# Patient Record
Sex: FEMALE | Race: WHITE | NOT HISPANIC OR LATINO | URBAN - METROPOLITAN AREA
[De-identification: names, ages, dates, MRNs, and addresses within clinical notes are randomized per-mention and may not be internally consistent; named-entity substitution may affect disease eponyms.]

---

## 2017-05-07 ENCOUNTER — EMERGENCY (EMERGENCY)
Facility: HOSPITAL | Age: 82
LOS: 1 days | End: 2017-05-07
Payer: MEDICARE

## 2017-05-07 PROCEDURE — 99283 EMERGENCY DEPT VISIT LOW MDM: CPT | Mod: 25

## 2017-05-07 PROCEDURE — 12004 RPR S/N/AX/GEN/TRK7.6-12.5CM: CPT

## 2017-05-09 ENCOUNTER — EMERGENCY (EMERGENCY)
Facility: HOSPITAL | Age: 82
LOS: 1 days | End: 2017-05-09
Payer: MEDICARE

## 2017-05-09 PROCEDURE — 99282 EMERGENCY DEPT VISIT SF MDM: CPT

## 2018-06-11 ENCOUNTER — OUTPATIENT (OUTPATIENT)
Dept: OUTPATIENT SERVICES | Facility: HOSPITAL | Age: 83
LOS: 1 days | End: 2018-06-11
Payer: MEDICARE

## 2018-06-11 VITALS
WEIGHT: 154.32 LBS | HEART RATE: 84 BPM | TEMPERATURE: 97 F | DIASTOLIC BLOOD PRESSURE: 90 MMHG | SYSTOLIC BLOOD PRESSURE: 180 MMHG | RESPIRATION RATE: 16 BRPM | HEIGHT: 60 IN

## 2018-06-11 DIAGNOSIS — Z98.890 OTHER SPECIFIED POSTPROCEDURAL STATES: Chronic | ICD-10-CM

## 2018-06-11 DIAGNOSIS — Z29.9 ENCOUNTER FOR PROPHYLACTIC MEASURES, UNSPECIFIED: ICD-10-CM

## 2018-06-11 DIAGNOSIS — I10 ESSENTIAL (PRIMARY) HYPERTENSION: ICD-10-CM

## 2018-06-11 DIAGNOSIS — Z01.818 ENCOUNTER FOR OTHER PREPROCEDURAL EXAMINATION: ICD-10-CM

## 2018-06-11 DIAGNOSIS — R19.00 INTRA-ABDOMINAL AND PELVIC SWELLING, MASS AND LUMP, UNSPECIFIED SITE: ICD-10-CM

## 2018-06-11 PROBLEM — Z00.00 ENCOUNTER FOR PREVENTIVE HEALTH EXAMINATION: Status: ACTIVE | Noted: 2018-06-11

## 2018-06-11 LAB
ALBUMIN SERPL ELPH-MCNC: 4.5 G/DL — SIGNIFICANT CHANGE UP (ref 3.3–5.2)
ALP SERPL-CCNC: 64 U/L — SIGNIFICANT CHANGE UP (ref 40–120)
ALT FLD-CCNC: 11 U/L — SIGNIFICANT CHANGE UP
ANION GAP SERPL CALC-SCNC: 15 MMOL/L — SIGNIFICANT CHANGE UP (ref 5–17)
APTT BLD: 27.7 SEC — SIGNIFICANT CHANGE UP (ref 27.5–37.4)
AST SERPL-CCNC: 21 U/L — SIGNIFICANT CHANGE UP
BASOPHILS # BLD AUTO: 0 K/UL — SIGNIFICANT CHANGE UP (ref 0–0.2)
BASOPHILS NFR BLD AUTO: 0.5 % — SIGNIFICANT CHANGE UP (ref 0–2)
BILIRUB SERPL-MCNC: 0.4 MG/DL — SIGNIFICANT CHANGE UP (ref 0.4–2)
BLD GP AB SCN SERPL QL: SIGNIFICANT CHANGE UP
BUN SERPL-MCNC: 31 MG/DL — HIGH (ref 8–20)
CALCIUM SERPL-MCNC: 9.9 MG/DL — SIGNIFICANT CHANGE UP (ref 8.6–10.2)
CANCER AG125 SERPL-ACNC: 19 U/ML — SIGNIFICANT CHANGE UP
CEA SERPL-MCNC: 8.1 NG/ML — HIGH (ref 0–3.8)
CHLORIDE SERPL-SCNC: 95 MMOL/L — LOW (ref 98–107)
CO2 SERPL-SCNC: 25 MMOL/L — SIGNIFICANT CHANGE UP (ref 22–29)
CREAT SERPL-MCNC: 1.25 MG/DL — SIGNIFICANT CHANGE UP (ref 0.5–1.3)
EOSINOPHIL # BLD AUTO: 0.3 K/UL — SIGNIFICANT CHANGE UP (ref 0–0.5)
EOSINOPHIL NFR BLD AUTO: 5.1 % — SIGNIFICANT CHANGE UP (ref 0–6)
GLUCOSE SERPL-MCNC: 96 MG/DL — SIGNIFICANT CHANGE UP (ref 70–115)
HBA1C BLD-MCNC: 5.6 % — SIGNIFICANT CHANGE UP (ref 4–5.6)
HCT VFR BLD CALC: 39.3 % — SIGNIFICANT CHANGE UP (ref 37–47)
HGB BLD-MCNC: 12.5 G/DL — SIGNIFICANT CHANGE UP (ref 12–16)
INR BLD: 1.06 RATIO — SIGNIFICANT CHANGE UP (ref 0.88–1.16)
LYMPHOCYTES # BLD AUTO: 1.3 K/UL — SIGNIFICANT CHANGE UP (ref 1–4.8)
LYMPHOCYTES # BLD AUTO: 22.9 % — SIGNIFICANT CHANGE UP (ref 20–55)
MCHC RBC-ENTMCNC: 29.3 PG — SIGNIFICANT CHANGE UP (ref 27–31)
MCHC RBC-ENTMCNC: 31.8 G/DL — LOW (ref 32–36)
MCV RBC AUTO: 92.3 FL — SIGNIFICANT CHANGE UP (ref 81–99)
MONOCYTES # BLD AUTO: 0.6 K/UL — SIGNIFICANT CHANGE UP (ref 0–0.8)
MONOCYTES NFR BLD AUTO: 9.4 % — SIGNIFICANT CHANGE UP (ref 3–10)
NEUTROPHILS # BLD AUTO: 3.6 K/UL — SIGNIFICANT CHANGE UP (ref 1.8–8)
NEUTROPHILS NFR BLD AUTO: 61.9 % — SIGNIFICANT CHANGE UP (ref 37–73)
PLATELET # BLD AUTO: 264 K/UL — SIGNIFICANT CHANGE UP (ref 150–400)
POTASSIUM SERPL-MCNC: 4.5 MMOL/L — SIGNIFICANT CHANGE UP (ref 3.5–5.3)
POTASSIUM SERPL-SCNC: 4.5 MMOL/L — SIGNIFICANT CHANGE UP (ref 3.5–5.3)
PROT SERPL-MCNC: 7.4 G/DL — SIGNIFICANT CHANGE UP (ref 6.6–8.7)
PROTHROM AB SERPL-ACNC: 11.7 SEC — SIGNIFICANT CHANGE UP (ref 9.8–12.7)
RBC # BLD: 4.26 M/UL — LOW (ref 4.4–5.2)
RBC # FLD: 13.6 % — SIGNIFICANT CHANGE UP (ref 11–15.6)
SODIUM SERPL-SCNC: 135 MMOL/L — SIGNIFICANT CHANGE UP (ref 135–145)
TYPE + AB SCN PNL BLD: SIGNIFICANT CHANGE UP
WBC # BLD: 5.8 K/UL — SIGNIFICANT CHANGE UP (ref 4.8–10.8)
WBC # FLD AUTO: 5.8 K/UL — SIGNIFICANT CHANGE UP (ref 4.8–10.8)

## 2018-06-11 PROCEDURE — 85027 COMPLETE CBC AUTOMATED: CPT

## 2018-06-11 PROCEDURE — 86301 IMMUNOASSAY TUMOR CA 19-9: CPT

## 2018-06-11 PROCEDURE — 82378 CARCINOEMBRYONIC ANTIGEN: CPT

## 2018-06-11 PROCEDURE — 83036 HEMOGLOBIN GLYCOSYLATED A1C: CPT

## 2018-06-11 PROCEDURE — 86850 RBC ANTIBODY SCREEN: CPT

## 2018-06-11 PROCEDURE — 85610 PROTHROMBIN TIME: CPT

## 2018-06-11 PROCEDURE — 86900 BLOOD TYPING SEROLOGIC ABO: CPT

## 2018-06-11 PROCEDURE — 93010 ELECTROCARDIOGRAM REPORT: CPT

## 2018-06-11 PROCEDURE — 80053 COMPREHEN METABOLIC PANEL: CPT

## 2018-06-11 PROCEDURE — 71046 X-RAY EXAM CHEST 2 VIEWS: CPT

## 2018-06-11 PROCEDURE — 86304 IMMUNOASSAY TUMOR CA 125: CPT

## 2018-06-11 PROCEDURE — 85730 THROMBOPLASTIN TIME PARTIAL: CPT

## 2018-06-11 PROCEDURE — G0463: CPT

## 2018-06-11 PROCEDURE — 93005 ELECTROCARDIOGRAM TRACING: CPT

## 2018-06-11 PROCEDURE — 86901 BLOOD TYPING SEROLOGIC RH(D): CPT

## 2018-06-11 PROCEDURE — 36415 COLL VENOUS BLD VENIPUNCTURE: CPT

## 2018-06-11 PROCEDURE — 71046 X-RAY EXAM CHEST 2 VIEWS: CPT | Mod: 26

## 2018-06-11 RX ORDER — UBIDECARENONE 100 MG
1 CAPSULE ORAL
Qty: 0 | Refills: 0 | COMMUNITY

## 2018-06-11 RX ORDER — LEVOTHYROXINE SODIUM 125 MCG
1 TABLET ORAL
Qty: 0 | Refills: 0 | COMMUNITY

## 2018-06-11 RX ORDER — CHOLECALCIFEROL (VITAMIN D3) 125 MCG
1 CAPSULE ORAL
Qty: 0 | Refills: 0 | COMMUNITY

## 2018-06-11 RX ORDER — SODIUM CHLORIDE 9 MG/ML
3 INJECTION INTRAMUSCULAR; INTRAVENOUS; SUBCUTANEOUS ONCE
Qty: 0 | Refills: 0 | Status: DISCONTINUED | OUTPATIENT
Start: 2018-06-19 | End: 2018-07-04

## 2018-06-11 NOTE — H&P PST ADULT - ASSESSMENT
89 yo female with pelvic mass.  CAPRINI SCORE [CLOT]    AGE RELATED RISK FACTORS                                                       MOBILITY RELATED FACTORS  [ ] Age 41-60 years                                            (1 Point)                  [ ] Bed rest                                                        (1 Point)  [ ] Age: 61-74 years                                           (2 Points)                 [ ] Plaster cast                                                   (2 Points)  [x ] Age= 75 years                                              (3 Points)                 [ ] Bed bound for more than 72 hours                 (2 Points)    DISEASE RELATED RISK FACTORS                                               GENDER SPECIFIC FACTORS  [ ] Edema in the lower extremities                       (1 Point)                  [ ] Pregnancy                                                     (1 Point)  [ ] Varicose veins                                               (1 Point)                  [ ] Post-partum < 6 weeks                                   (1 Point)             [ x] BMI > 25 Kg/m2                                            (1 Point)                  [ ] Hormonal therapy  or oral contraception          (1 Point)                 [ ] Sepsis (in the previous month)                        (1 Point)                  [ ] History of pregnancy complications                 (1 point)  [ ] Pneumonia or serious lung disease                                               [1 ] Unexplained or recurrent                     (1 Point)           (in the previous month)                               (1 Point)  [ ] Abnormal pulmonary function test                     (1 Point)                 SURGERY RELATED RISK FACTORS  [ ] Acute myocardial infarction                              (1 Point)                 [ ]  Section                                             (1 Point)  [ ] Congestive heart failure (in the previous month)  (1 Point)               [ ] Minor surgery                                                  (1 Point)   [ ] Inflammatory bowel disease                             (1 Point)                 [ ] Arthroscopic surgery                                        (2 Points)  [ ] Central venous access                                      (2 Points)                [x ] General surgery lasting more than 45 minutes   (2 Points)       [ ] Stroke (in the previous month)                          (5 Points)               [ ] Elective arthroplasty                                         (5 Points)                                                                                                                                               HEMATOLOGY RELATED FACTORS                                                 TRAUMA RELATED RISK FACTORS  [ ] Prior episodes of VTE                                     (3 Points)                 [ ] Fracture of the hip, pelvis, or leg                       (5 Points)  [ ] Positive family history for VTE                         (3 Points)                 [ ] Acute spinal cord injury (in the previous month)  (5 Points)  [ ] Prothrombin 80345 A                                     (3 Points)                 [ ] Paralysis  (less than 1 month)                             (5 Points)  [ ] Factor V Leiden                                             (3 Points)                  [ ] Multiple Trauma within 1 month                        (5 Points)  [ ] Lupus anticoagulants                                     (3 Points)                                                           [ ] Anticardiolipin antibodies                               (3 Points)                                                       [ ] High homocysteine in the blood                      (3 Points)                                             [ ] Other congenital or acquired thrombophilia      (3 Points)                                                [ ] Heparin induced thrombocytopenia                  (3 Points)                                          Total Score [   7  ]

## 2018-06-12 ENCOUNTER — APPOINTMENT (OUTPATIENT)
Dept: CARDIOLOGY | Facility: CLINIC | Age: 83
End: 2018-06-12
Payer: MEDICARE

## 2018-06-12 VITALS
WEIGHT: 154 LBS | DIASTOLIC BLOOD PRESSURE: 70 MMHG | HEART RATE: 90 BPM | SYSTOLIC BLOOD PRESSURE: 128 MMHG | HEIGHT: 66 IN | BODY MASS INDEX: 24.75 KG/M2

## 2018-06-12 DIAGNOSIS — Z78.9 OTHER SPECIFIED HEALTH STATUS: ICD-10-CM

## 2018-06-12 DIAGNOSIS — I10 ESSENTIAL (PRIMARY) HYPERTENSION: ICD-10-CM

## 2018-06-12 DIAGNOSIS — Z01.810 ENCOUNTER FOR PREPROCEDURAL CARDIOVASCULAR EXAMINATION: ICD-10-CM

## 2018-06-12 LAB — CANCER AG19-9 SERPL-ACNC: 14.4 U/ML — SIGNIFICANT CHANGE UP

## 2018-06-12 PROCEDURE — 99203 OFFICE O/P NEW LOW 30 MIN: CPT

## 2018-06-12 RX ORDER — QUINAPRIL AND HYDROCHLOROTHIAZIDE 12.5; 2 MG/1; MG/1
20-12.5 TABLET ORAL
Qty: 90 | Refills: 0 | Status: ACTIVE | COMMUNITY
Start: 2017-12-22

## 2018-06-12 RX ORDER — LEVOTHYROXINE SODIUM 0.1 MG/1
100 TABLET ORAL
Qty: 90 | Refills: 0 | Status: ACTIVE | COMMUNITY
Start: 2017-06-07

## 2018-06-12 RX ORDER — ALBUTEROL SULFATE 90 UG/1
108 (90 BASE) AEROSOL, METERED RESPIRATORY (INHALATION)
Qty: 85 | Refills: 0 | Status: ACTIVE | COMMUNITY
Start: 2017-12-22

## 2018-06-13 ENCOUNTER — APPOINTMENT (OUTPATIENT)
Dept: CARDIOLOGY | Facility: CLINIC | Age: 83
End: 2018-06-13
Payer: MEDICARE

## 2018-06-13 PROCEDURE — 93015 CV STRESS TEST SUPVJ I&R: CPT

## 2018-06-13 PROCEDURE — 93306 TTE W/DOPPLER COMPLETE: CPT

## 2018-06-19 ENCOUNTER — OUTPATIENT (OUTPATIENT)
Dept: OUTPATIENT SERVICES | Facility: HOSPITAL | Age: 83
LOS: 1 days | End: 2018-06-19
Payer: MEDICARE

## 2018-06-19 ENCOUNTER — RESULT REVIEW (OUTPATIENT)
Age: 83
End: 2018-06-19

## 2018-06-19 VITALS
RESPIRATION RATE: 15 BRPM | HEART RATE: 65 BPM | OXYGEN SATURATION: 98 % | DIASTOLIC BLOOD PRESSURE: 65 MMHG | SYSTOLIC BLOOD PRESSURE: 160 MMHG

## 2018-06-19 VITALS
HEIGHT: 66 IN | TEMPERATURE: 98 F | RESPIRATION RATE: 14 BRPM | DIASTOLIC BLOOD PRESSURE: 64 MMHG | WEIGHT: 154.32 LBS | HEART RATE: 80 BPM | SYSTOLIC BLOOD PRESSURE: 149 MMHG | OXYGEN SATURATION: 100 %

## 2018-06-19 DIAGNOSIS — Z98.890 OTHER SPECIFIED POSTPROCEDURAL STATES: Chronic | ICD-10-CM

## 2018-06-19 DIAGNOSIS — R19.00 INTRA-ABDOMINAL AND PELVIC SWELLING, MASS AND LUMP, UNSPECIFIED SITE: ICD-10-CM

## 2018-06-19 LAB
BLD GP AB SCN SERPL QL: SIGNIFICANT CHANGE UP
GLUCOSE BLDC GLUCOMTR-MCNC: 98 MG/DL — SIGNIFICANT CHANGE UP (ref 70–99)
TYPE + AB SCN PNL BLD: SIGNIFICANT CHANGE UP

## 2018-06-19 PROCEDURE — 86850 RBC ANTIBODY SCREEN: CPT

## 2018-06-19 PROCEDURE — 86901 BLOOD TYPING SEROLOGIC RH(D): CPT

## 2018-06-19 PROCEDURE — 88307 TISSUE EXAM BY PATHOLOGIST: CPT | Mod: 26

## 2018-06-19 PROCEDURE — 58661 LAPAROSCOPY REMOVE ADNEXA: CPT

## 2018-06-19 PROCEDURE — 86900 BLOOD TYPING SEROLOGIC ABO: CPT

## 2018-06-19 PROCEDURE — 88112 CYTOPATH CELL ENHANCE TECH: CPT | Mod: 26

## 2018-06-19 PROCEDURE — 82962 GLUCOSE BLOOD TEST: CPT

## 2018-06-19 PROCEDURE — 88112 CYTOPATH CELL ENHANCE TECH: CPT

## 2018-06-19 PROCEDURE — 36415 COLL VENOUS BLD VENIPUNCTURE: CPT

## 2018-06-19 RX ORDER — SODIUM CHLORIDE 9 MG/ML
1000 INJECTION, SOLUTION INTRAVENOUS
Qty: 0 | Refills: 0 | Status: DISCONTINUED | OUTPATIENT
Start: 2018-06-19 | End: 2018-06-20

## 2018-06-19 RX ORDER — ONDANSETRON 8 MG/1
4 TABLET, FILM COATED ORAL ONCE
Qty: 0 | Refills: 0 | Status: DISCONTINUED | OUTPATIENT
Start: 2018-06-19 | End: 2018-06-20

## 2018-06-19 RX ORDER — FENTANYL CITRATE 50 UG/ML
50 INJECTION INTRAVENOUS
Qty: 0 | Refills: 0 | Status: DISCONTINUED | OUTPATIENT
Start: 2018-06-19 | End: 2018-06-20

## 2018-06-19 RX ORDER — CEFAZOLIN SODIUM 1 G
2000 VIAL (EA) INJECTION ONCE
Qty: 0 | Refills: 0 | Status: COMPLETED | OUTPATIENT
Start: 2018-06-19 | End: 2018-06-19

## 2018-06-19 RX ADMIN — Medication 100 MILLIGRAM(S): at 15:20

## 2018-06-19 NOTE — ASU DISCHARGE PLAN (ADULT/PEDIATRIC). - MEDICATION SUMMARY - MEDICATIONS TO TAKE
I will START or STAY ON the medications listed below when I get home from the hospital:    Naprosyn 500 mg oral tablet  -- 1 tab(s) by mouth 2 times a day   -- Check with your doctor before becoming pregnant.  May cause drowsiness or dizziness.  Obtain medical advice before taking any non-prescription drugs as some may affect the action of this medication.  Take with food or milk.    -- Indication: For Pain Control    Percocet 5/325 oral tablet  -- 1 tab(s) by mouth every 6 hours, As Needed -for severe pain MDD:4 tabs   -- Caution federal law prohibits the transfer of this drug to any person other  than the person for whom it was prescribed.  May cause drowsiness.  Alcohol may intensify this effect.  Use care when operating dangerous machinery.  This prescription cannot be refilled.  This product contains acetaminophen.  Do not use  with any other product containing acetaminophen to prevent possible liver damage.  Using more of this medication than prescribed may cause serious breathing problems.    -- Indication: For Pain Control    quinapril-hydrochlorothiazide 20mg-12.5mg oral tablet  -- 1 tab(s) by mouth once a day  -- Indication: For As per PMD    Co Q-10 100 mg oral capsule  -- 1 cap(s) by mouth once a day  -- Indication: For As per PMD    levothyroxine 100 mcg (0.1 mg) oral tablet  -- 1 tab(s) by mouth once a day  -- Indication: For As per PMD    Vitamin C, E, and Neha Hips oral capsule  -- 1 cap(s) by mouth once a day  -- Indication: For As per PMD    Vitamin D3 1000 intl units oral tablet  -- 1 tab(s) by mouth once a day  -- Indication: For As per PMD

## 2018-06-19 NOTE — ASU DISCHARGE PLAN (ADULT/PEDIATRIC). - ACTIVITY LEVEL
May walk and climb stairs as often as youd like, no vigorous activity, do not lift anything greater than 10lbs, nothing per vagina x 6 weeks, do not drive while on pain medication. May walk and climb stairs as often as you would like, no vigorous activity, do not lift anything greater than 10lbs, nothing per vagina x 6 weeks, do not drive while on pain medication.

## 2018-06-19 NOTE — ASU DISCHARGE PLAN (ADULT/PEDIATRIC). - ITEMS TO FOLLOWUP WITH YOUR PHYSICIAN'S
Please follow-up with PA in one week for post-op visit/removal of sutures.  Follow-up with Dr. Montes in two weeks to review pathology report. Please follow-up with PA in one week for post-op visit/removal of sutures.  Follow-up with Dr. Montes in two weeks to review pathology report.    Please contact your provider for any pain uncontrolled by medication, excessive bleeding or Fever>100.4  Please take Naprosyn 1 tablet every 12 hours x 3 days, may take percocet as prescribed for breakthrough pain.

## 2018-06-19 NOTE — ASU PREOP CHECKLIST - SELECT TESTS ORDERED
EKG/CBC/POCT Blood Glucose/BMP/PT/PTT/INR/Type and Screen BMP/CBC/PT/PTT/Type and Screen/INR/EKG/98/POCT Blood Glucose

## 2018-06-21 LAB — NON-GYNECOLOGICAL CYTOLOGY STUDY: SIGNIFICANT CHANGE UP

## 2018-06-25 LAB — SURGICAL PATHOLOGY FINAL REPORT - CH: SIGNIFICANT CHANGE UP

## 2019-07-31 PROBLEM — I10 ESSENTIAL (PRIMARY) HYPERTENSION: Chronic | Status: ACTIVE | Noted: 2018-06-11

## 2019-07-31 PROBLEM — E03.9 HYPOTHYROIDISM, UNSPECIFIED: Chronic | Status: ACTIVE | Noted: 2018-06-11

## 2019-08-14 ENCOUNTER — EMERGENCY (EMERGENCY)
Facility: HOSPITAL | Age: 84
LOS: 1 days | End: 2019-08-14
Admitting: EMERGENCY MEDICINE
Payer: MEDICARE

## 2019-08-14 DIAGNOSIS — Z98.890 OTHER SPECIFIED POSTPROCEDURAL STATES: Chronic | ICD-10-CM

## 2019-08-14 PROCEDURE — 73110 X-RAY EXAM OF WRIST: CPT | Mod: 26,RT

## 2019-08-14 PROCEDURE — 70450 CT HEAD/BRAIN W/O DYE: CPT | Mod: 26

## 2019-08-14 PROCEDURE — 12013 RPR F/E/E/N/L/M 2.6-5.0 CM: CPT

## 2019-08-14 PROCEDURE — 70486 CT MAXILLOFACIAL W/O DYE: CPT | Mod: 26

## 2019-08-14 PROCEDURE — 99284 EMERGENCY DEPT VISIT MOD MDM: CPT | Mod: 25

## 2019-08-15 ENCOUNTER — APPOINTMENT (OUTPATIENT)
Dept: FAMILY MEDICINE | Facility: CLINIC | Age: 84
End: 2019-08-15

## 2019-08-23 ENCOUNTER — APPOINTMENT (OUTPATIENT)
Dept: FAMILY MEDICINE | Facility: CLINIC | Age: 84
End: 2019-08-23

## 2019-09-05 ENCOUNTER — APPOINTMENT (OUTPATIENT)
Dept: ORTHOPEDIC SURGERY | Facility: CLINIC | Age: 84
End: 2019-09-05
Payer: MEDICARE

## 2019-09-05 VITALS
HEART RATE: 86 BPM | DIASTOLIC BLOOD PRESSURE: 71 MMHG | SYSTOLIC BLOOD PRESSURE: 142 MMHG | WEIGHT: 158 LBS | HEIGHT: 67 IN | BODY MASS INDEX: 24.8 KG/M2

## 2019-09-05 DIAGNOSIS — Z78.9 OTHER SPECIFIED HEALTH STATUS: ICD-10-CM

## 2019-09-05 PROCEDURE — 73110 X-RAY EXAM OF WRIST: CPT | Mod: 26,RT

## 2019-09-05 PROCEDURE — 99203 OFFICE O/P NEW LOW 30 MIN: CPT

## 2019-09-05 PROCEDURE — 73130 X-RAY EXAM OF HAND: CPT | Mod: 26,RT

## 2019-09-05 RX ORDER — CEPHALEXIN 500 MG/1
500 CAPSULE ORAL
Qty: 14 | Refills: 0 | Status: COMPLETED | COMMUNITY
Start: 2019-07-31

## 2019-09-05 NOTE — PHYSICAL EXAM
[de-identified] : - Constitutional: This is an elderly female, in no obvious distress.  She appears younger than her stated age.\par - Psych: Patient is alert and oriented to person, place and time.  Patient has a normal mood and affect.\par - Cardiovascular: Normal pulses throughout the upper extremities.  No significant varicosities are noted in the upper extremities. \par - Neuro: Strength and sensation are intact throughout the upper extremities.  Patient has normal coordination.\par - Respiratory:  Patient exhibits no evidence of shortness of breath or difficulty breathing.\par - Skin: No rashes, lesions, or other abnormalities are noted in the upper extremities.\par \par ---\par \par Examination of her right wrist demonstrates swelling dorsally.  She is tender along the distal radius dorsally.  She has pain and limitation of motion.  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  There are no acute neurologic deficits noted distally. [de-identified] : \par PA, lateral and oblique radiographs of her right wrist and hand demonstrate a minimally displaced distal radius fracture involving the dorsal cortex.  There is minimal loss of palmar tilt.  She has essentially neutral tilt.  The alignment is acceptable.  She also has evidence of advanced CMC and STT joint arthritis.

## 2019-09-05 NOTE — HISTORY OF PRESENT ILLNESS
[Right] : right hand dominant [FreeTextEntry1] :  She comes in today for evaluation of an injury to her right wrist.  She fell 3 weeks ago.  She was seen at the emergency room at [facility] and she was told there was no obvious fracture.  She was splinted.  She also sustained facial lacerations which were sutured.\par \par

## 2019-09-05 NOTE — DISCUSSION/SUMMARY
[FreeTextEntry1] : She has findings consistent with a minimally displaced right distal radius fracture involving the dorsal cortex.  This is a relatively stable fracture.  Again, it is 3 weeks old.\par \par I had a discussion regarding today's visit, the diagnosis, and treatment recommendations / options.  As the fracture is already 3 weeks old, I do believe that it is reasonable to continue to protect this with a splint.  She was instructed on full-time protection with a splint except for bathing and activity modification.  She will follow-up in 3 weeks for repeat x-rays.\par \par The patient has agreed to this plan of management and has expressed full understanding.  All questions were fully answered to the patient's satisfaction.\par \par Over 50% of the time spent with the patient was on counseling the patient on the above diagnosis, treatment plan and prognosis.

## 2019-09-26 ENCOUNTER — APPOINTMENT (OUTPATIENT)
Dept: ORTHOPEDIC SURGERY | Facility: CLINIC | Age: 84
End: 2019-09-26
Payer: MEDICARE

## 2019-09-26 VITALS — HEIGHT: 67 IN | WEIGHT: 158 LBS | BODY MASS INDEX: 24.8 KG/M2

## 2019-09-26 PROCEDURE — 73110 X-RAY EXAM OF WRIST: CPT | Mod: 26,RT

## 2019-09-26 PROCEDURE — 99213 OFFICE O/P EST LOW 20 MIN: CPT

## 2019-09-26 NOTE — PHYSICAL EXAM
[de-identified] : - Constitutional: This is an elderly female, in no obvious distress.  She appears younger than her stated age.\par - Psych: Patient is alert and oriented to person, place and time.  Patient has a normal mood and affect.\par - Cardiovascular: Normal pulses throughout the upper extremities.  No significant varicosities are noted in the upper extremities. \par - Neuro: Strength and sensation are intact throughout the upper extremities.  Patient has normal coordination.\par - Respiratory:  Patient exhibits no evidence of shortness of breath or difficulty breathing.\par - Skin: No rashes, lesions, or other abnormalities are noted in the upper extremities.\par \par ---\par \par Examination of her right wrist demonstrates decreased swelling.  She is no longer tender along the distal radius dorsally.  She has improved motion of the wrist and digits.  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  There are no acute neurologic deficits noted distally. [de-identified] : \par PA, lateral and oblique radiographs of her right wrist demonstrate her distal radius fracture to be nearly healed, in good alignment.

## 2019-09-26 NOTE — DISCUSSION/SUMMARY
[FreeTextEntry1] : Further treatment recommendations / options were discussed.  At this time, she was instructed on weaning off of the splint and range of motion exercises.  She will follow-up in 2 to 3 weeks for a final check.\par \par I had a discussion regarding today's visit, the diagnosis, and my treatment recommendations.  The patient has agreed to the above plan of management and has expressed full understanding.  All questions were fully answered to the patient's satisfaction.\par \par I spent at least 25 minutes of face-to-face time with the patient.  Over 50% of this time was spent on counseling the patient on the above diagnosis, treatment plan and prognosis.

## 2019-10-09 PROBLEM — S52.501A DISTAL RADIUS FRACTURE, RIGHT: Status: ACTIVE | Noted: 2019-09-05

## 2019-10-09 PROBLEM — M19.041 ARTHRITIS OF HAND, RIGHT: Status: ACTIVE | Noted: 2019-09-05

## 2019-10-14 ENCOUNTER — APPOINTMENT (OUTPATIENT)
Dept: ORTHOPEDIC SURGERY | Facility: CLINIC | Age: 84
End: 2019-10-14
Payer: MEDICARE

## 2019-10-14 VITALS — WEIGHT: 158 LBS | HEIGHT: 67 IN | BODY MASS INDEX: 24.8 KG/M2

## 2019-10-14 DIAGNOSIS — S52.501A UNSPECIFIED FRACTURE OF THE LOWER END OF RIGHT RADIUS, INITIAL ENCOUNTER FOR CLOSED FRACTURE: ICD-10-CM

## 2019-10-14 DIAGNOSIS — M19.041 PRIMARY OSTEOARTHRITIS, RIGHT HAND: ICD-10-CM

## 2019-10-14 PROCEDURE — 99213 OFFICE O/P EST LOW 20 MIN: CPT

## 2019-10-14 PROCEDURE — 73110 X-RAY EXAM OF WRIST: CPT | Mod: 26,RT

## 2019-10-14 NOTE — DISCUSSION/SUMMARY
[FreeTextEntry1] : I had a discussion regarding today's visit, the diagnosis, and my treatment recommendations.  Further treatment recommendations / options were discussed.  At this time, she no longer requires the splint.  She was instructed on continued range of motion exercises.  She will advance her activities, according to her symptoms.  She will follow-up on an as-needed basis.\par \par The patient has agreed to the above plan of management and has expressed full understanding.  All questions were fully answered to the patient's satisfaction.\par \par I spent at least 25 minutes of face-to-face time with the patient.  Over 50% of this time was spent on counseling the patient on the above diagnosis, treatment plan and prognosis.

## 2019-10-14 NOTE — HISTORY OF PRESENT ILLNESS
[FreeTextEntry1] : 8 1/2 weeks status post right distal radius fracture.  \par \par She is doing well and her pain is improved.  She   is having mild persistent pain at the wrist on occasion. The pain is worsened when grabbing objects and making a fist. She rates her pan 10 out of 10 at its worst. She continues to wear the brace.

## 2019-10-14 NOTE — PHYSICAL EXAM
[de-identified] : - Constitutional: This is an elderly female, in no obvious distress.  She appears younger than her stated age.\par - Psych: Patient is alert and oriented to person, place and time.  Patient has a normal mood and affect.\par - Cardiovascular: Normal pulses throughout the upper extremities.  No significant varicosities are noted in the upper extremities. \par - Neuro: Strength and sensation are intact throughout the upper extremities.  Patient has normal coordination.\par - Respiratory:  Patient exhibits no evidence of shortness of breath or difficulty breathing.\par - Skin: No rashes, lesions, or other abnormalities are noted in the upper extremities.\par \par ---\par \par Examination of her right wrist demonstrates decreased swelling.  She is no longer tender along the distal radius dorsally.  She has improved motion of the wrist and digits.  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  There are no acute neurologic deficits noted distally. [de-identified] : \par PA, lateral and oblique radiographs of her right wrist demonstrate her distal radius fracture to be healed, in good alignment.

## 2019-10-14 NOTE — END OF VISIT
[FreeTextEntry3] : I, Paul Jensen, acted solely as a scribe for Dr. Wenceslao Ross MD on this date 10/14/2019. \par \par All medical records entries made by the Scribe were at my, Dr. Wenceslao Ross MD, direction and personally dictated by me on 10/14/2019. I have personally reviewed the chart and agree that the record accurately reflects my personal performance of the history, physical exam, assessment, and plan. I have also personally directed, reviewed, and agreed with the chart.

## 2022-12-28 ENCOUNTER — OFFICE (OUTPATIENT)
Dept: URBAN - METROPOLITAN AREA CLINIC 8 | Facility: CLINIC | Age: 87
Setting detail: OPHTHALMOLOGY
End: 2022-12-28
Payer: MEDICARE

## 2022-12-28 DIAGNOSIS — H02.035: ICD-10-CM

## 2022-12-28 DIAGNOSIS — H26.491: ICD-10-CM

## 2022-12-28 DIAGNOSIS — H16.222: ICD-10-CM

## 2022-12-28 DIAGNOSIS — Z96.1: ICD-10-CM

## 2022-12-28 DIAGNOSIS — H40.1131: ICD-10-CM

## 2022-12-28 PROCEDURE — 92133 CPTRZD OPH DX IMG PST SGM ON: CPT | Performed by: OPHTHALMOLOGY

## 2022-12-28 PROCEDURE — 92004 COMPRE OPH EXAM NEW PT 1/>: CPT | Performed by: OPHTHALMOLOGY

## 2022-12-28 ASSESSMENT — TONOMETRY
OD_IOP_MMHG: 16
OS_IOP_MMHG: 16

## 2022-12-28 ASSESSMENT — REFRACTION_MANIFEST
OS_VA1: 20/40-2
OS_ADD: +2.75
OS_CYLINDER: -1.50
OS_AXIS: 90
OU_VA: 20/40-
OD_AXIS: 060
OD_VA1: 20/40-2
OD_SPHERE: -0.25
OS_VA2: 20/30(J2)
OD_CYLINDER: -0.50
OD_ADD: +2.75
OD_VA2: 20/30(J2)
OS_SPHERE: +0.75

## 2022-12-28 ASSESSMENT — KERATOMETRY
METHOD_AUTO_MANUAL: AUTO
OD_AXISANGLE_DEGREES: 004
OD_K2POWER_DIOPTERS: 45.00
OS_K2POWER_DIOPTERS: 46.00
OD_K1POWER_DIOPTERS: 43.50
OS_AXISANGLE_DEGREES: 002
OS_K1POWER_DIOPTERS: 44.00

## 2022-12-28 ASSESSMENT — SPHEQUIV_DERIVED
OD_SPHEQUIV: -0.5
OS_SPHEQUIV: 0.75
OD_SPHEQUIV: -1.125
OS_SPHEQUIV: 0

## 2022-12-28 ASSESSMENT — CONFRONTATIONAL VISUAL FIELD TEST (CVF)
OS_FINDINGS: FULL
OD_FINDINGS: FULL

## 2022-12-28 ASSESSMENT — SUPERFICIAL PUNCTATE KERATITIS (SPK): OS_SPK: 1+ 2+

## 2022-12-28 ASSESSMENT — REFRACTION_AUTOREFRACTION
OD_SPHERE: -0.50
OS_CYLINDER: -3.00
OD_CYLINDER: -1.25
OS_AXIS: 092
OD_AXIS: 062
OS_SPHERE: +2.25

## 2022-12-28 ASSESSMENT — AXIALLENGTH_DERIVED
OD_AL: 23.7559
OS_AL: 23.0535
OS_AL: 22.7778
OD_AL: 23.5115

## 2022-12-28 ASSESSMENT — VISUAL ACUITY
OS_BCVA: 20/50-
OD_BCVA: 20/70

## 2022-12-28 ASSESSMENT — DRY EYES - PHYSICIAN NOTES: OS_GENERALCOMMENTS: INFERIORLY

## 2022-12-28 ASSESSMENT — LID POSITION - ENTROPION: OS_ENTROPION: LLL

## 2023-01-04 ENCOUNTER — OFFICE (OUTPATIENT)
Dept: URBAN - METROPOLITAN AREA CLINIC 104 | Facility: CLINIC | Age: 88
Setting detail: OPHTHALMOLOGY
End: 2023-01-04
Payer: MEDICARE

## 2023-01-04 DIAGNOSIS — H02.035: ICD-10-CM

## 2023-01-04 DIAGNOSIS — H16.222: ICD-10-CM

## 2023-01-04 PROCEDURE — 92285 EXTERNAL OCULAR PHOTOGRAPHY: CPT | Performed by: OPHTHALMOLOGY

## 2023-01-04 PROCEDURE — 99214 OFFICE O/P EST MOD 30 MIN: CPT | Performed by: OPHTHALMOLOGY

## 2023-01-04 ASSESSMENT — DRY EYES - PHYSICIAN NOTES: OS_GENERALCOMMENTS: INFERIORLY

## 2023-01-04 ASSESSMENT — SUPERFICIAL PUNCTATE KERATITIS (SPK): OS_SPK: 1+ 2+

## 2023-01-04 ASSESSMENT — LID POSITION - ENTROPION: OS_ENTROPION: LLL 2+

## 2023-01-06 ASSESSMENT — VISUAL ACUITY
OD_BCVA: 20/50
OS_BCVA: 20/40

## 2023-02-07 ENCOUNTER — NON-APPOINTMENT (OUTPATIENT)
Age: 88
End: 2023-02-07

## 2023-02-28 NOTE — ASU PATIENT PROFILE, ADULT - MENTAL HEALTH CONDITIONS/SYMPTOMS, PROFILE
Ok for Colonoscopy order. She can call [phone] to schedule colonoscopy.     Ok for Tdap.     Mary Sarkar MD     none

## 2023-03-20 ENCOUNTER — OFFICE (OUTPATIENT)
Dept: URBAN - METROPOLITAN AREA CLINIC 6 | Facility: CLINIC | Age: 88
Setting detail: OPHTHALMOLOGY
End: 2023-03-20
Payer: MEDICARE

## 2023-03-20 DIAGNOSIS — H02.035: ICD-10-CM

## 2023-03-20 PROCEDURE — 67950 REVISION OF EYELID: CPT | Performed by: OPHTHALMOLOGY

## 2023-03-20 PROCEDURE — 67921 REPAIR EYELID DEFECT: CPT | Performed by: OPHTHALMOLOGY

## 2023-03-20 ASSESSMENT — VISUAL ACUITY
OS_BCVA: 20/40
OD_BCVA: 20/50

## 2023-03-31 ENCOUNTER — OFFICE (OUTPATIENT)
Dept: URBAN - METROPOLITAN AREA CLINIC 38 | Facility: CLINIC | Age: 88
Setting detail: OPHTHALMOLOGY
End: 2023-03-31
Payer: MEDICARE

## 2023-03-31 DIAGNOSIS — H02.035: ICD-10-CM

## 2023-03-31 DIAGNOSIS — H16.222: ICD-10-CM

## 2023-03-31 PROCEDURE — 99024 POSTOP FOLLOW-UP VISIT: CPT | Performed by: OPHTHALMOLOGY

## 2023-03-31 ASSESSMENT — CONFRONTATIONAL VISUAL FIELD TEST (CVF)
OS_FINDINGS: FULL
OD_FINDINGS: FULL

## 2023-03-31 ASSESSMENT — VISUAL ACUITY
OD_BCVA: 20/40-2
OS_BCVA: 20/40

## 2023-03-31 ASSESSMENT — LID POSITION - ENTROPION: OS_ENTROPION: ABSENT

## 2023-03-31 ASSESSMENT — DRY EYES - PHYSICIAN NOTES: OS_GENERALCOMMENTS: INFERIORLY

## 2023-03-31 ASSESSMENT — SUPERFICIAL PUNCTATE KERATITIS (SPK): OS_SPK: 1+ 2+

## 2023-04-23 ENCOUNTER — NON-APPOINTMENT (OUTPATIENT)
Age: 88
End: 2023-04-23

## 2023-05-12 ENCOUNTER — OFFICE (OUTPATIENT)
Dept: URBAN - METROPOLITAN AREA CLINIC 38 | Facility: CLINIC | Age: 88
Setting detail: OPHTHALMOLOGY
End: 2023-05-12
Payer: MEDICARE

## 2023-05-12 DIAGNOSIS — H16.222: ICD-10-CM

## 2023-05-12 DIAGNOSIS — H02.035: ICD-10-CM

## 2023-05-12 PROCEDURE — 99024 POSTOP FOLLOW-UP VISIT: CPT | Performed by: OPHTHALMOLOGY

## 2023-05-12 ASSESSMENT — DRY EYES - PHYSICIAN NOTES: OS_GENERALCOMMENTS: INFERIORLY

## 2023-05-12 ASSESSMENT — CONFRONTATIONAL VISUAL FIELD TEST (CVF)
OS_FINDINGS: FULL
OD_FINDINGS: FULL

## 2023-05-12 ASSESSMENT — VISUAL ACUITY
OD_BCVA: 20/50-2
OS_BCVA: 20/50+2

## 2023-05-12 ASSESSMENT — LID POSITION - ENTROPION: OS_ENTROPION: ABSENT

## 2023-05-12 ASSESSMENT — SUPERFICIAL PUNCTATE KERATITIS (SPK): OS_SPK: 1+ 2+

## 2024-01-04 ENCOUNTER — OFFICE (OUTPATIENT)
Dept: URBAN - METROPOLITAN AREA CLINIC 38 | Facility: CLINIC | Age: 89
Setting detail: OPHTHALMOLOGY
End: 2024-01-04
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
## 2024-01-04 PROCEDURE — NO SHOW FE NO SHOW FEE: Performed by: OPHTHALMOLOGY

## 2024-09-05 ENCOUNTER — OUTPATIENT (OUTPATIENT)
Dept: OUTPATIENT SERVICES | Facility: HOSPITAL | Age: 89
LOS: 1 days | End: 2024-09-05

## 2024-09-05 DIAGNOSIS — Z98.890 OTHER SPECIFIED POSTPROCEDURAL STATES: Chronic | ICD-10-CM

## 2024-09-05 DIAGNOSIS — C20 MALIGNANT NEOPLASM OF RECTUM: ICD-10-CM

## 2024-09-09 ENCOUNTER — APPOINTMENT (OUTPATIENT)
Dept: HEMATOLOGY ONCOLOGY | Facility: CLINIC | Age: 89
End: 2024-09-09

## 2024-09-09 ENCOUNTER — NON-APPOINTMENT (OUTPATIENT)
Age: 89
End: 2024-09-09

## 2024-09-09 VITALS
BODY MASS INDEX: 21.97 KG/M2 | HEART RATE: 81 BPM | SYSTOLIC BLOOD PRESSURE: 172 MMHG | DIASTOLIC BLOOD PRESSURE: 91 MMHG | TEMPERATURE: 98.2 F | HEIGHT: 67 IN | OXYGEN SATURATION: 98 % | WEIGHT: 140 LBS

## 2024-09-09 DIAGNOSIS — C19 MALIGNANT NEOPLASM OF RECTOSIGMOID JUNCTION: ICD-10-CM

## 2024-09-09 PROCEDURE — 99205 OFFICE O/P NEW HI 60 MIN: CPT

## 2024-09-09 PROCEDURE — G2211 COMPLEX E/M VISIT ADD ON: CPT

## 2024-09-09 NOTE — ASSESSMENT
[FreeTextEntry1] : I discussed with the patient regarding diagnosis, prognosis, and treatment options for the condition.    I recommend CT scan to determine prognosis and treatment options.  Advised patient to see Dr. Scales for radiation therapy.  I am hesitant for patient to undergo surgery due to patient's age.  I recommended chemotherapy with low dose Xeloda. Risks and benefits of chemotherapy including but not limited to emesis, myelosuppression, fatigue, drug reaction, neuropathy, hand and foot syndrome discussed in detail with the patient.  She is hesitant towards chemo due to change in quality of life.  Will order CT scan.  Will repeat labs and check for anemia.  Will set up patient with Dr. Scales.   Follow-up in 1 month.

## 2024-09-12 ENCOUNTER — APPOINTMENT (OUTPATIENT)
Dept: CT IMAGING | Facility: CLINIC | Age: 89
End: 2024-09-12

## 2024-09-12 PROCEDURE — 74176 CT ABD & PELVIS W/O CONTRAST: CPT

## 2024-09-12 PROCEDURE — 71250 CT THORAX DX C-: CPT

## 2024-09-13 ENCOUNTER — TRANSCRIPTION ENCOUNTER (OUTPATIENT)
Age: 89
End: 2024-09-13

## 2024-09-19 PROBLEM — C19 COLORECTAL CANCER: Status: ACTIVE | Noted: 2024-09-19

## 2024-09-19 NOTE — HISTORY OF PRESENT ILLNESS
[de-identified] : 89 yo F here for evaluation of rectal cancer referred by surgeon Dr. Bojorquez.  Reports some anal bleeding and pencil thin stools.  Also notes weight loss and loss of appetite. HGB 10.5 as of March.  Pt has not had cancer screenings.  No FMHx of cancers.  No other medical concerns.  Non-smoker/ Non-drinker.  Denies fever, SOB, chest pain, night sweats, or weight loss. No spontaneous bruising or bleeding.

## 2024-09-19 NOTE — REASON FOR VISIT
[Initial Consultation] : an initial consultation [Family Member] : family member [FreeTextEntry2] : rectal cancer

## 2024-09-19 NOTE — REVIEW OF SYSTEMS
[Fever] : no fever [Chills] : no chills [Chest Pain] : no chest pain [Palpitations] : no palpitations [Shortness Of Breath] : no shortness of breath [Wheezing] : no wheezing [Abdominal Pain] : no abdominal pain [Vomiting] : no vomiting [Easy Bleeding] : no tendency for easy bleeding [Easy Bruising] : no tendency for easy bruising

## 2024-09-19 NOTE — ADDENDUM
[FreeTextEntry1] :  All medical record entries made by the Scribe were at my, Dr. Lewis Masters, direction and personally dictated by me on 09/09/2024. I have reviewed the chart and agree that the record accurately reflects my personal performance of the history, physical exam, assessment and plan. I have also personally directed, reviewed, and agreed with the chart.   I, Marilia Novoa, documented this note as a scribe on behalf of Dr. Lewis Masters on 09/09/2024.

## 2024-09-19 NOTE — HISTORY OF PRESENT ILLNESS
[de-identified] : 89 yo F here for evaluation of rectal cancer referred by surgeon Dr. Bojorquez.  Reports some anal bleeding and pencil thin stools.  Also notes weight loss and loss of appetite. HGB 10.5 as of March.  Pt has not had cancer screenings.  No FMHx of cancers.  No other medical concerns.  Non-smoker/ Non-drinker.  Denies fever, SOB, chest pain, night sweats, or weight loss. No spontaneous bruising or bleeding.

## 2024-09-24 ENCOUNTER — APPOINTMENT (OUTPATIENT)
Dept: MRI IMAGING | Facility: CLINIC | Age: 89
End: 2024-09-24
Payer: MEDICARE

## 2024-09-24 PROCEDURE — 72196 MRI PELVIS W/DYE: CPT

## 2024-09-24 PROCEDURE — A9585: CPT | Mod: JW

## 2024-09-30 ENCOUNTER — APPOINTMENT (OUTPATIENT)
Dept: HEMATOLOGY ONCOLOGY | Facility: CLINIC | Age: 89
End: 2024-09-30

## 2024-10-08 ENCOUNTER — APPOINTMENT (OUTPATIENT)
Dept: HEMATOLOGY ONCOLOGY | Facility: CLINIC | Age: 89
End: 2024-10-08

## 2024-12-16 ENCOUNTER — APPOINTMENT (OUTPATIENT)
Dept: CT IMAGING | Facility: CLINIC | Age: 88
End: 2024-12-16
Payer: MEDICARE

## 2024-12-16 PROCEDURE — 74177 CT ABD & PELVIS W/CONTRAST: CPT

## 2025-04-23 ENCOUNTER — INPATIENT (INPATIENT)
Facility: HOSPITAL | Age: 89
LOS: 8 days | Discharge: NURSING, REHAB OR LONG-TERM CARE | DRG: 577 | End: 2025-05-02
Attending: INTERNAL MEDICINE | Admitting: INTERNAL MEDICINE
Payer: COMMERCIAL

## 2025-04-23 VITALS
OXYGEN SATURATION: 97 % | WEIGHT: 145.06 LBS | DIASTOLIC BLOOD PRESSURE: 73 MMHG | TEMPERATURE: 98 F | HEART RATE: 88 BPM | HEIGHT: 66 IN | SYSTOLIC BLOOD PRESSURE: 149 MMHG | RESPIRATION RATE: 16 BRPM

## 2025-04-23 DIAGNOSIS — Z98.890 OTHER SPECIFIED POSTPROCEDURAL STATES: Chronic | ICD-10-CM

## 2025-04-23 DIAGNOSIS — T14.8XXA OTHER INJURY OF UNSPECIFIED BODY REGION, INITIAL ENCOUNTER: ICD-10-CM

## 2025-04-23 LAB
ANION GAP SERPL CALC-SCNC: 12 MMOL/L — SIGNIFICANT CHANGE UP (ref 5–17)
APTT BLD: 27.2 SEC — SIGNIFICANT CHANGE UP (ref 26.1–36.8)
BLD GP AB SCN SERPL QL: SIGNIFICANT CHANGE UP
BUN SERPL-MCNC: 22.7 MG/DL — HIGH (ref 8–20)
CALCIUM SERPL-MCNC: 8.9 MG/DL — SIGNIFICANT CHANGE UP (ref 8.4–10.5)
CHLORIDE SERPL-SCNC: 99 MMOL/L — SIGNIFICANT CHANGE UP (ref 96–108)
CO2 SERPL-SCNC: 23 MMOL/L — SIGNIFICANT CHANGE UP (ref 22–29)
CREAT SERPL-MCNC: 1.14 MG/DL — SIGNIFICANT CHANGE UP (ref 0.5–1.3)
EGFR: 44 ML/MIN/1.73M2 — LOW
EGFR: 44 ML/MIN/1.73M2 — LOW
GLUCOSE SERPL-MCNC: 119 MG/DL — HIGH (ref 70–99)
HCT VFR BLD CALC: 27.7 % — LOW (ref 34.5–45)
HGB BLD-MCNC: 8.8 G/DL — LOW (ref 11.5–15.5)
INR BLD: 1.03 RATIO — SIGNIFICANT CHANGE UP (ref 0.85–1.16)
MAGNESIUM SERPL-MCNC: 1.9 MG/DL — SIGNIFICANT CHANGE UP (ref 1.6–2.6)
MCHC RBC-ENTMCNC: 26.4 PG — LOW (ref 27–34)
MCHC RBC-ENTMCNC: 31.8 G/DL — LOW (ref 32–36)
MCV RBC AUTO: 83.2 FL — SIGNIFICANT CHANGE UP (ref 80–100)
NRBC # BLD AUTO: 0 K/UL — SIGNIFICANT CHANGE UP (ref 0–0)
NRBC # FLD: 0 K/UL — SIGNIFICANT CHANGE UP (ref 0–0)
NRBC BLD AUTO-RTO: 0 /100 WBCS — SIGNIFICANT CHANGE UP (ref 0–0)
PHOSPHATE SERPL-MCNC: 3 MG/DL — SIGNIFICANT CHANGE UP (ref 2.4–4.7)
PLATELET # BLD AUTO: 323 K/UL — SIGNIFICANT CHANGE UP (ref 150–400)
PMV BLD: 8.7 FL — SIGNIFICANT CHANGE UP (ref 7–13)
POTASSIUM SERPL-MCNC: 4.9 MMOL/L — SIGNIFICANT CHANGE UP (ref 3.5–5.3)
POTASSIUM SERPL-SCNC: 4.9 MMOL/L — SIGNIFICANT CHANGE UP (ref 3.5–5.3)
PROTHROM AB SERPL-ACNC: 12 SEC — SIGNIFICANT CHANGE UP (ref 9.9–13.4)
RBC # BLD: 3.33 M/UL — LOW (ref 3.8–5.2)
RBC # FLD: 16.6 % — HIGH (ref 10.3–14.5)
SODIUM SERPL-SCNC: 134 MMOL/L — LOW (ref 135–145)
WBC # BLD: 8.03 K/UL — SIGNIFICANT CHANGE UP (ref 3.8–10.5)
WBC # FLD AUTO: 8.03 K/UL — SIGNIFICANT CHANGE UP (ref 3.8–10.5)

## 2025-04-23 PROCEDURE — 99285 EMERGENCY DEPT VISIT HI MDM: CPT

## 2025-04-23 PROCEDURE — 93010 ELECTROCARDIOGRAM REPORT: CPT

## 2025-04-23 PROCEDURE — 99222 1ST HOSP IP/OBS MODERATE 55: CPT

## 2025-04-23 RX ORDER — SODIUM CHLORIDE 9 G/1000ML
1000 INJECTION, SOLUTION INTRAVENOUS
Refills: 0 | Status: DISCONTINUED | OUTPATIENT
Start: 2025-04-24 | End: 2025-04-24

## 2025-04-23 RX ORDER — HYDROMORPHONE/SOD CHLOR,ISO/PF 2 MG/10 ML
0.5 SYRINGE (ML) INJECTION
Refills: 0 | Status: DISCONTINUED | OUTPATIENT
Start: 2025-04-23 | End: 2025-04-24

## 2025-04-23 RX ORDER — ACETAMINOPHEN 500 MG/5ML
975 LIQUID (ML) ORAL EVERY 6 HOURS
Refills: 0 | Status: DISCONTINUED | OUTPATIENT
Start: 2025-04-23 | End: 2025-04-24

## 2025-04-23 RX ORDER — SENNA 187 MG
2 TABLET ORAL AT BEDTIME
Refills: 0 | Status: DISCONTINUED | OUTPATIENT
Start: 2025-04-23 | End: 2025-04-24

## 2025-04-23 RX ORDER — INFLUENZA A VIRUS A/IDAHO/07/2018 (H1N1) ANTIGEN (MDCK CELL DERIVED, PROPIOLACTONE INACTIVATED, INFLUENZA A VIRUS A/INDIANA/08/2018 (H3N2) ANTIGEN (MDCK CELL DERIVED, PROPIOLACTONE INACTIVATED), INFLUENZA B VIRUS B/SINGAPORE/INFTT-16-0610/2016 ANTIGEN (MDCK CELL DERIVED, PROPIOLACTONE INACTIVATED), INFLUENZA B VIRUS B/IOWA/06/2017 ANTIGEN (MDCK CELL DERIVED, PROPIOLACTONE INACTIVATED) 15; 15; 15; 15 UG/.5ML; UG/.5ML; UG/.5ML; UG/.5ML
0.5 INJECTION, SUSPENSION INTRAMUSCULAR ONCE
Refills: 0 | Status: DISCONTINUED | OUTPATIENT
Start: 2025-04-23 | End: 2025-05-02

## 2025-04-23 RX ORDER — LISINOPRIL 5 MG/1
20 TABLET ORAL ONCE
Refills: 0 | Status: COMPLETED | OUTPATIENT
Start: 2025-04-23 | End: 2025-04-23

## 2025-04-23 RX ORDER — LISINOPRIL 5 MG/1
20 TABLET ORAL DAILY
Refills: 0 | Status: DISCONTINUED | OUTPATIENT
Start: 2025-04-23 | End: 2025-04-24

## 2025-04-23 RX ORDER — IBUPROFEN 200 MG
600 TABLET ORAL EVERY 6 HOURS
Refills: 0 | Status: DISCONTINUED | OUTPATIENT
Start: 2025-04-23 | End: 2025-04-24

## 2025-04-23 RX ORDER — LEVOTHYROXINE SODIUM 300 MCG
100 TABLET ORAL DAILY
Refills: 0 | Status: DISCONTINUED | OUTPATIENT
Start: 2025-04-23 | End: 2025-04-24

## 2025-04-23 RX ORDER — ENOXAPARIN SODIUM 100 MG/ML
40 INJECTION SUBCUTANEOUS EVERY 24 HOURS
Refills: 0 | Status: DISCONTINUED | OUTPATIENT
Start: 2025-04-23 | End: 2025-04-24

## 2025-04-23 RX ADMIN — ENOXAPARIN SODIUM 40 MILLIGRAM(S): 100 INJECTION SUBCUTANEOUS at 22:08

## 2025-04-23 NOTE — ED ADULT NURSE NOTE - CHIEF COMPLAINT QUOTE
Pt transferred from [facility] for plastics. Pt slipped getting out of the shower this morning and she cut her left lower leg on te metal track. Pt has approx 10 in laceration and degloving. Received tdap and antibiotics at [place].

## 2025-04-23 NOTE — H&P ADULT - ASSESSMENT
89y Female with HTN, hypothyroidism presents as a trauma transfer from [place] for traumatic degloving injury to her LLE. Patient states that she slipped and fell in the shower, cutting her leg on the edge of the shower. Trauma workup negative. Patient denies head trauma or LOC. Patient denies anticoagulation use. VSS and patient AF.    Plan:  - Admit to trauma floor   - Reg diet, NPO at [facility] for OR in AM  - IVF at [facility]  - Repeat labs now and in AM  - Replete electrolytes as necessary  - Restart home medications  - Tetanus if not given from OSH  - IV antibiotics ( Ancef)  - SCDs ( right leg only),, LVX    Patient seen and discussed with Dr. Whipple 89y Female with HTN, hypothyroidism presents as a trauma transfer from [place] for traumatic degloving injury to her LLE. Patient states that she slipped and fell in the shower, cutting her leg on the edge of the shower. Trauma workup negative. Patient denies head trauma or LOC. Patient denies anticoagulation use. VSS and patient AF.    Plan:  - Admit to trauma floor   -pt for plastics closure/graft in AM  - Reg diet, NPO at [facility] for OR in AM  - IVF at [facility]  - Repeat labs now and in AM  - Replete electrolytes as necessary  - Restart home medications  - Tetanus if not given from OSH  - IV antibiotics ( Ancef)  - SCDs ( right leg only),, LVX    Patient seen and discussed with Dr. Whipple

## 2025-04-23 NOTE — H&P ADULT - ATTENDING COMMENTS
Mrs. Potter fell in her shower which resulted in a substantial left lower leg degloving  -preoping for plastics eval tomorrow in OR  -NPO/IVF past midnight  -pain control  -restart home meds after surgery  -dvt prophylaxis  -tetanus and abx given in ER    Clark Whipple MD FACS  Acute and Critical Care Surgery  Director of Emergency General Surgery @ [facility]  - General Surgery @ [facility]

## 2025-04-23 NOTE — ED ADULT TRIAGE NOTE - CHIEF COMPLAINT QUOTE
Pt transferred from [facility] for plastics. Pt slipped getting out of the shower this morning and she cut her left lower leg on te metal track. Pt has approx 10 in laceration and degloving. Received tdap and antibiotics at [place].

## 2025-04-23 NOTE — CONSULT NOTE ADULT - PROBLEM SELECTOR RECOMMENDATION 9
89 yo female s/p fall with large skin avulsion injury of the left lower extremity.   - Will need surgical repair, debridement and skin graft placement with VAC.   - Tetanus ppx  - IV atbx  - Pain control  - Leg elevation  - Consider geriatric consultation  - Oral Protein dietary supplementation  - Please call with questions or concerns.

## 2025-04-23 NOTE — PATIENT PROFILE ADULT - ARE SIGNIFICANT INDICATORS COMPLETE.
[place] Suicide Severity Rating Scale  1. Have you wished you were dead or wished you could go to sleep and not wake up? (past month): No (02/10/25 1359)  2. Have you actually had any thoughts of killing yourself? (past month): No (02/10/25 1359)  6. Have you ever done anything, started to do anything, or prepared to do anything to end your life? (lifetime): No (02/10/25 1359)  Suicide Evaluation: Negative Screen - [place] (02/10/25 1359)    Recent PHQ 2/9 Score    PHQ 2:  PHQ 2 Score Adult PHQ 2 Score Adult PHQ 2 Interpretation Little interest or pleasure in activity?   2/10/2025   1:59 PM 0 No further screening needed 0       PHQ 9:     PHQ-2/9 Depression Screening  Little interest or pleasure in activity?: Not at all  Feeling down, depressed or hopeless?: Not at all  Initial depression screening score:: 0  PHQ2 Interpretation: No further screening needed         Health Maintenance       Diabetes Eye Exam (Yearly)  Never done    Shingles Vaccine (1 of 2)  Never done    Pneumococcal Vaccine 50+ (1 of 1 - PCV)  Never done    Hepatitis C Screening (Once)  Never done    Respiratory Syncytial Virus (RSV) Vaccine 60+ (1 - Risk 60-74 years 1-dose series)  Never done    Abdominal Aortic Aneurysm (AAA) Screening (Once)  Never done    Influenza Vaccine (1)  Never done    COVID-19 Vaccine (1 - 2024-25 season)  Never done    Diabetes Foot Exam (Yearly)  Overdue since 11/8/2024    Microalbumin Ratio (Yearly)  Due since 1/31/2025    Traditional Medicare- Medicare Wellness Visit (Yearly)  Due since 2/1/2025           Following review of the above:  Patient is not proceeding with: Abdominal Aortic Aneurysm (AAA) screening, Diabetes Eye Exam, Hepatitis C Screening, Microalbumin Ratio, COVID-19, Influenza, Pneumococcal, Respiratory Syncytial Virus (RSV), and Shingles    Note: Refer to final orders and clinician documentation.        Yes

## 2025-04-23 NOTE — ED PROVIDER NOTE - OBJECTIVE STATEMENT
89F presents as transfer from [facility] for degloving injury to left leg after catching her leg on the shower earlier today. Pt received tetanus shot and 2g Ancef PTA. Denies further injuries.

## 2025-04-23 NOTE — ED PROVIDER NOTE - PHYSICAL EXAMINATION
General: Awake, alert, lying in bed in NAD  HEENT: Normocephalic, atraumatic. No scleral icterus or conjunctival injection. EOMI. Moist mucous membranes. Oropharynx clear.   Neck:. Soft and supple.  Cardiac: RRR, Peripheral pulses 2+ and symmetric. No LE edema.  Resp: Lungs CTAB. No accessory muscle use  Abd: Soft, non-tender, non-distended. No guarding, rebound, or rigidity.  Back: Spine midline and non-tender.   Skin: LLE bandaged with Kerlix  Neuro: AO x 4. Moves all extremities symmetrically. Motor strength and sensation grossly intact.  Psych: Appropriate mood and affect

## 2025-04-23 NOTE — ED ADULT NURSE NOTE - OBJECTIVE STATEMENT
[place] [facility] [place]. Pt slipped getting out of the shower this morning and she cut her left lower leg on te metal track. Pt has approx 10 in laceration and degloving. Received tdap and antibiotics at [place].

## 2025-04-23 NOTE — H&P ADULT - HISTORY OF PRESENT ILLNESS
HPI: 89y Female with HTN, hypothyroidism presents as a trauma transfer from [place] for traumatic degloving injuryto her LLE. Patient states that she slipped and fell in the shower, cutting her leg onthe edge of the shower. Trauma workup negative. Patient denies head trauma or LOC.      PAST MEDICAL & SURGICAL HISTORY:  Hypertension      Hypothyroid      S/P eye surgery  lower lid      S/P sinus surgery        Home Meds: Home Medications:  Co Q-10 100 mg oral capsule: 1 cap(s) orally once a day (11 Jun 2018 08:52)  levothyroxine 100 mcg (0.1 mg) oral tablet: 1 tab(s) orally once a day (11 Jun 2018 08:52)  quinapril-hydrochlorothiazide 20mg-12.5mg oral tablet: 1 tab(s) orally once a day (11 Jun 2018 08:52)  Vitamin C, E, and Neha Hips oral capsule: 1 cap(s) orally once a day (11 Jun 2018 08:52)  Vitamin D3 1000 intl units oral tablet: 1 tab(s) orally once a day (11 Jun 2018 08:52)    Allergies: Allergies    No Known Allergies    Intolerances      Soc:   Advanced Directives: Presumed Full Code     CURRENT MEDICATIONS:   --------------------------------------------------------------------------------------  Neurologic Medications  acetaminophen     Tablet .. 975 milliGRAM(s) Oral every 6 hours  HYDROmorphone  Injectable 0.5 milliGRAM(s) IV Push every 3 hours PRN Severe Pain (7 - 10)  ibuprofen  Tablet. 600 milliGRAM(s) Oral every 6 hours PRN Mild Pain (1 - 3)    Respiratory Medications    Cardiovascular Medications    Gastrointestinal Medications  senna 2 Tablet(s) Oral at bedtime    Genitourinary Medications    Hematologic/Oncologic Medications  enoxaparin Injectable 40 milliGRAM(s) SubCutaneous every 24 hours    Antimicrobial/Immunologic Medications    Endocrine/Metabolic Medications    Topical/Other Medications    --------------------------------------------------------------------------------------    VITAL SIGNS, INS/OUTS (last 24 hours):  --------------------------------------------------------------------------------------  ICU Vital Signs Last 24 Hrs  T(C): 36.5 (23 Apr 2025 19:05), Max: 36.5 (23 Apr 2025 19:05)  T(F): 97.7 (23 Apr 2025 19:05), Max: 97.7 (23 Apr 2025 19:05)  HR: 100 (23 Apr 2025 19:05) (88 - 100)  BP: 171/78 (23 Apr 2025 19:05) (149/73 - 171/78)  BP(mean): --  ABP: --  ABP(mean): --  RR: 17 (23 Apr 2025 19:05) (16 - 17)  SpO2: 95% (23 Apr 2025 19:05) (95% - 97%)    O2 Parameters below as of 23 Apr 2025 19:05  Patient On (Oxygen Delivery Method): room air          I&O's Summary    --------------------------------------------------------------------------------------    EXAM:  Constitutional: patient appears comfortable resting in bed, in no apparent distress  Neurological: GCS15, A&O x 3  Musculoskeletal: COMBS x 4 spontaneously, no obvious bony deformity  Skin: large degloving to anterior LLE from tibial tuberosity to ankle, approximately 15cm x 8cm  LABS  --------------------------------------------------------------------------------------  Labs:  CAPILLARY BLOOD GLUCOSE                      LFTs:         Coags:                  --------------------------------------------------------------------------------------   HPI: 89y Female with HTN, hypothyroidism presents as a trauma transfer from [place] for traumatic degloving injury to her E. Patient states that she slipped and fell in the shower, cutting her leg on the edge of the shower. Trauma workup negative. Patient denies head trauma or LOC. Patient denies anticoagulation use. VSS and patient AF      PAST MEDICAL & SURGICAL HISTORY:  Hypertension      Hypothyroid      S/P eye surgery  lower lid      S/P sinus surgery        Home Meds: Home Medications:  Co Q-10 100 mg oral capsule: 1 cap(s) orally once a day (11 Jun 2018 08:52)  levothyroxine 100 mcg (0.1 mg) oral tablet: 1 tab(s) orally once a day (11 Jun 2018 08:52)  quinapril-hydrochlorothiazide 20mg-12.5mg oral tablet: 1 tab(s) orally once a day (11 Jun 2018 08:52)  Vitamin C, E, and Neha Hips oral capsule: 1 cap(s) orally once a day (11 Jun 2018 08:52)  Vitamin D3 1000 intl units oral tablet: 1 tab(s) orally once a day (11 Jun 2018 08:52)    Allergies: Allergies    No Known Allergies    Intolerances      Soc:   Advanced Directives: Presumed Full Code     CURRENT MEDICATIONS:   --------------------------------------------------------------------------------------  Neurologic Medications  acetaminophen     Tablet .. 975 milliGRAM(s) Oral every 6 hours  HYDROmorphone  Injectable 0.5 milliGRAM(s) IV Push every 3 hours PRN Severe Pain (7 - 10)  ibuprofen  Tablet. 600 milliGRAM(s) Oral every 6 hours PRN Mild Pain (1 - 3)    Respiratory Medications    Cardiovascular Medications    Gastrointestinal Medications  senna 2 Tablet(s) Oral at bedtime    Genitourinary Medications    Hematologic/Oncologic Medications  enoxaparin Injectable 40 milliGRAM(s) SubCutaneous every 24 hours    Antimicrobial/Immunologic Medications    Endocrine/Metabolic Medications    Topical/Other Medications    --------------------------------------------------------------------------------------    VITAL SIGNS, INS/OUTS (last 24 hours):  --------------------------------------------------------------------------------------  ICU Vital Signs Last 24 Hrs  T(C): 36.5 (23 Apr 2025 19:05), Max: 36.5 (23 Apr 2025 19:05)  T(F): 97.7 (23 Apr 2025 19:05), Max: 97.7 (23 Apr 2025 19:05)  HR: 100 (23 Apr 2025 19:05) (88 - 100)  BP: 171/78 (23 Apr 2025 19:05) (149/73 - 171/78)  BP(mean): --  ABP: --  ABP(mean): --  RR: 17 (23 Apr 2025 19:05) (16 - 17)  SpO2: 95% (23 Apr 2025 19:05) (95% - 97%)    O2 Parameters below as of 23 Apr 2025 19:05  Patient On (Oxygen Delivery Method): room air          I&O's Summary    --------------------------------------------------------------------------------------    EXAM:  Constitutional: patient appears comfortable resting in bed, in no apparent distress  Neurological: GCS15, A&O x 3  Musculoskeletal: COMBS x 4 spontaneously, no obvious bony deformity  Skin: large degloving to anterior LLE from tibial tuberosity to ankle, approximately 15cm x 8cm  LABS  --------------------------------------------------------------------------------------  Labs:  CAPILLARY BLOOD GLUCOSE                      LFTs:         Coags:                  --------------------------------------------------------------------------------------   HPI: 89y Female with HTN, hypothyroidism presents as a trauma transfer from [place] for traumatic degloving injury to her E. Patient states that she slipped and fell in the shower, cutting her leg on the edge of the shower. Trauma workup negative. Patient denies head trauma or LOC. Patient denies anticoagulation use. VSS and patient AF      PAST MEDICAL & SURGICAL HISTORY:  Hypertension  Hypothyroid  S/P eye surgery  S/P sinus surgery    Home Meds: Home Medications:  Co Q-10 100 mg oral capsule: 1 cap(s) orally once a day (11 Jun 2018 08:52)  levothyroxine 100 mcg (0.1 mg) oral tablet: 1 tab(s) orally once a day (11 Jun 2018 08:52)  quinapril-hydrochlorothiazide 20mg-12.5mg oral tablet: 1 tab(s) orally once a day (11 Jun 2018 08:52)  Vitamin C, E, and Neha Hips oral capsule: 1 cap(s) orally once a day (11 Jun 2018 08:52)  Vitamin D3 1000 intl units oral tablet: 1 tab(s) orally once a day (11 Jun 2018 08:52)    Allergies: Allergies    No Known Allergies    Intolerances      Soc:   Advanced Directives: Presumed Full Code     CURRENT MEDICATIONS:   --------------------------------------------------------------------------------------  Neurologic Medications  acetaminophen     Tablet .. 975 milliGRAM(s) Oral every 6 hours  HYDROmorphone  Injectable 0.5 milliGRAM(s) IV Push every 3 hours PRN Severe Pain (7 - 10)  ibuprofen  Tablet. 600 milliGRAM(s) Oral every 6 hours PRN Mild Pain (1 - 3)    Respiratory Medications    Cardiovascular Medications    Gastrointestinal Medications  senna 2 Tablet(s) Oral at bedtime    Genitourinary Medications    Hematologic/Oncologic Medications  enoxaparin Injectable 40 milliGRAM(s) SubCutaneous every 24 hours    Antimicrobial/Immunologic Medications    Endocrine/Metabolic Medications    Topical/Other Medications    --------------------------------------------------------------------------------------    VITAL SIGNS, INS/OUTS (last 24 hours):  --------------------------------------------------------------------------------------  ICU Vital Signs Last 24 Hrs  T(C): 36.5 (23 Apr 2025 19:05), Max: 36.5 (23 Apr 2025 19:05)  T(F): 97.7 (23 Apr 2025 19:05), Max: 97.7 (23 Apr 2025 19:05)  HR: 100 (23 Apr 2025 19:05) (88 - 100)  BP: 171/78 (23 Apr 2025 19:05) (149/73 - 171/78)  BP(mean): --  ABP: --  ABP(mean): --  RR: 17 (23 Apr 2025 19:05) (16 - 17)  SpO2: 95% (23 Apr 2025 19:05) (95% - 97%)    O2 Parameters below as of 23 Apr 2025 19:05  Patient On (Oxygen Delivery Method): room air          I&O's Summary    --------------------------------------------------------------------------------------    EXAM:  Constitutional: patient appears comfortable resting in bed, in no apparent distress  Neurological: GCS15, A&O x 3  Musculoskeletal: COMBS x 4 spontaneously, no obvious bony deformity  Skin: large degloving to anterior LLE from tibial tuberosity to ankle, approximately 15cm x 8cm    ------------------------------------------------------------------------------------        --------------------------------------------------------------------------------------

## 2025-04-23 NOTE — PATIENT PROFILE ADULT - FALL HARM RISK - HARM RISK INTERVENTIONS

## 2025-04-23 NOTE — ED PROVIDER NOTE - CLINICAL SUMMARY MEDICAL DECISION MAKING FREE TEXT BOX
89F presents as [place] [facility] [place]gloving injury to left leg after catching her leg on the shower earlier today. Discussed with trauma surgery, plan for admission for skin graft, NPO after MN today. Tetanus and Ancef administered at prior hospital.

## 2025-04-24 ENCOUNTER — TRANSCRIPTION ENCOUNTER (OUTPATIENT)
Age: 89
End: 2025-04-24

## 2025-04-24 LAB
ALBUMIN SERPL ELPH-MCNC: 3.3 G/DL — SIGNIFICANT CHANGE UP (ref 3.3–5.2)
ALP SERPL-CCNC: 61 U/L — SIGNIFICANT CHANGE UP (ref 40–120)
ALT FLD-CCNC: 6 U/L — SIGNIFICANT CHANGE UP
ANION GAP SERPL CALC-SCNC: 11 MMOL/L — SIGNIFICANT CHANGE UP (ref 5–17)
AST SERPL-CCNC: 19 U/L — SIGNIFICANT CHANGE UP
BASOPHILS # BLD AUTO: 0.05 K/UL — SIGNIFICANT CHANGE UP (ref 0–0.2)
BASOPHILS NFR BLD AUTO: 0.8 % — SIGNIFICANT CHANGE UP (ref 0–2)
BILIRUB SERPL-MCNC: 0.5 MG/DL — SIGNIFICANT CHANGE UP (ref 0.4–2)
BUN SERPL-MCNC: 19.5 MG/DL — SIGNIFICANT CHANGE UP (ref 8–20)
CALCIUM SERPL-MCNC: 9.3 MG/DL — SIGNIFICANT CHANGE UP (ref 8.4–10.5)
CHLORIDE SERPL-SCNC: 99 MMOL/L — SIGNIFICANT CHANGE UP (ref 96–108)
CO2 SERPL-SCNC: 25 MMOL/L — SIGNIFICANT CHANGE UP (ref 22–29)
CREAT SERPL-MCNC: 1.13 MG/DL — SIGNIFICANT CHANGE UP (ref 0.5–1.3)
EGFR: 44 ML/MIN/1.73M2 — LOW
EGFR: 44 ML/MIN/1.73M2 — LOW
EOSINOPHIL # BLD AUTO: 0.12 K/UL — SIGNIFICANT CHANGE UP (ref 0–0.5)
EOSINOPHIL NFR BLD AUTO: 2 % — SIGNIFICANT CHANGE UP (ref 0–6)
GLUCOSE SERPL-MCNC: 99 MG/DL — SIGNIFICANT CHANGE UP (ref 70–99)
HCT VFR BLD CALC: 26.1 % — LOW (ref 34.5–45)
HGB BLD-MCNC: 8.4 G/DL — LOW (ref 11.5–15.5)
IMM GRANULOCYTES # BLD AUTO: 0.01 K/UL — SIGNIFICANT CHANGE UP (ref 0–0.07)
IMM GRANULOCYTES NFR BLD AUTO: 0.2 % — SIGNIFICANT CHANGE UP (ref 0–0.9)
LYMPHOCYTES # BLD AUTO: 0.81 K/UL — LOW (ref 1–3.3)
LYMPHOCYTES NFR BLD AUTO: 13.7 % — SIGNIFICANT CHANGE UP (ref 13–44)
MAGNESIUM SERPL-MCNC: 1.6 MG/DL — LOW (ref 1.8–2.6)
MCHC RBC-ENTMCNC: 26.7 PG — LOW (ref 27–34)
MCHC RBC-ENTMCNC: 32.2 G/DL — SIGNIFICANT CHANGE UP (ref 32–36)
MCV RBC AUTO: 82.9 FL — SIGNIFICANT CHANGE UP (ref 80–100)
MONOCYTES # BLD AUTO: 0.9 K/UL — SIGNIFICANT CHANGE UP (ref 0–0.9)
MONOCYTES NFR BLD AUTO: 15.2 % — HIGH (ref 2–14)
NEUTROPHILS # BLD AUTO: 4.02 K/UL — SIGNIFICANT CHANGE UP (ref 1.8–7.4)
NEUTROPHILS NFR BLD AUTO: 68.1 % — SIGNIFICANT CHANGE UP (ref 43–77)
NRBC # BLD AUTO: 0 K/UL — SIGNIFICANT CHANGE UP (ref 0–0)
NRBC # FLD: 0 K/UL — SIGNIFICANT CHANGE UP (ref 0–0)
NRBC BLD AUTO-RTO: 0 /100 WBCS — SIGNIFICANT CHANGE UP (ref 0–0)
PHOSPHATE SERPL-MCNC: 3 MG/DL — SIGNIFICANT CHANGE UP (ref 2.4–4.7)
PLATELET # BLD AUTO: 309 K/UL — SIGNIFICANT CHANGE UP (ref 150–400)
PMV BLD: 9.1 FL — SIGNIFICANT CHANGE UP (ref 7–13)
POTASSIUM SERPL-MCNC: 4.6 MMOL/L — SIGNIFICANT CHANGE UP (ref 3.5–5.3)
POTASSIUM SERPL-SCNC: 4.6 MMOL/L — SIGNIFICANT CHANGE UP (ref 3.5–5.3)
PROT SERPL-MCNC: 5.8 G/DL — LOW (ref 6.6–8.7)
RBC # BLD: 3.15 M/UL — LOW (ref 3.8–5.2)
RBC # FLD: 16.5 % — HIGH (ref 10.3–14.5)
SODIUM SERPL-SCNC: 135 MMOL/L — SIGNIFICANT CHANGE UP (ref 135–145)
WBC # BLD: 5.91 K/UL — SIGNIFICANT CHANGE UP (ref 3.8–10.5)
WBC # FLD AUTO: 5.91 K/UL — SIGNIFICANT CHANGE UP (ref 3.8–10.5)

## 2025-04-24 PROCEDURE — 99231 SBSQ HOSP IP/OBS SF/LOW 25: CPT

## 2025-04-24 PROCEDURE — 99222 1ST HOSP IP/OBS MODERATE 55: CPT

## 2025-04-24 RX ORDER — ACETAMINOPHEN 500 MG/5ML
975 LIQUID (ML) ORAL EVERY 6 HOURS
Refills: 0 | Status: DISCONTINUED | OUTPATIENT
Start: 2025-04-24 | End: 2025-05-02

## 2025-04-24 RX ORDER — CEFAZOLIN SODIUM IN 0.9 % NACL 3 G/100 ML
500 INTRAVENOUS SOLUTION, PIGGYBACK (ML) INTRAVENOUS
Refills: 0 | Status: COMPLETED | OUTPATIENT
Start: 2025-04-24 | End: 2025-04-25

## 2025-04-24 RX ORDER — LEVOTHYROXINE SODIUM 300 MCG
100 TABLET ORAL DAILY
Refills: 0 | Status: DISCONTINUED | OUTPATIENT
Start: 2025-04-24 | End: 2025-05-02

## 2025-04-24 RX ORDER — MAGNESIUM SULFATE 500 MG/ML
2 SYRINGE (ML) INJECTION ONCE
Refills: 0 | Status: COMPLETED | OUTPATIENT
Start: 2025-04-24 | End: 2025-04-24

## 2025-04-24 RX ORDER — SENNA 187 MG
2 TABLET ORAL AT BEDTIME
Refills: 0 | Status: DISCONTINUED | OUTPATIENT
Start: 2025-04-24 | End: 2025-05-02

## 2025-04-24 RX ORDER — ENOXAPARIN SODIUM 100 MG/ML
30 INJECTION SUBCUTANEOUS EVERY 24 HOURS
Refills: 0 | Status: DISCONTINUED | OUTPATIENT
Start: 2025-04-24 | End: 2025-05-02

## 2025-04-24 RX ORDER — LISINOPRIL 5 MG/1
40 TABLET ORAL DAILY
Refills: 0 | Status: DISCONTINUED | OUTPATIENT
Start: 2025-04-25 | End: 2025-04-25

## 2025-04-24 RX ORDER — IBUPROFEN 200 MG
600 TABLET ORAL EVERY 6 HOURS
Refills: 0 | Status: DISCONTINUED | OUTPATIENT
Start: 2025-04-24 | End: 2025-05-02

## 2025-04-24 RX ORDER — ONDANSETRON HCL/PF 4 MG/2 ML
4 VIAL (ML) INJECTION ONCE
Refills: 0 | Status: DISCONTINUED | OUTPATIENT
Start: 2025-04-24 | End: 2025-04-24

## 2025-04-24 RX ORDER — CEFAZOLIN SODIUM IN 0.9 % NACL 3 G/100 ML
INTRAVENOUS SOLUTION, PIGGYBACK (ML) INTRAVENOUS
Refills: 0 | Status: DISCONTINUED | OUTPATIENT
Start: 2025-04-24 | End: 2025-04-24

## 2025-04-24 RX ORDER — LISINOPRIL 5 MG/1
40 TABLET ORAL DAILY
Refills: 0 | Status: CANCELLED | OUTPATIENT
Start: 2025-04-25 | End: 2025-04-24

## 2025-04-24 RX ORDER — FENTANYL CITRATE-0.9 % NACL/PF 100MCG/2ML
25 SYRINGE (ML) INTRAVENOUS
Refills: 0 | Status: DISCONTINUED | OUTPATIENT
Start: 2025-04-24 | End: 2025-04-24

## 2025-04-24 RX ORDER — HYDROMORPHONE/SOD CHLOR,ISO/PF 2 MG/10 ML
0.5 SYRINGE (ML) INJECTION
Refills: 0 | Status: DISCONTINUED | OUTPATIENT
Start: 2025-04-24 | End: 2025-04-24

## 2025-04-24 RX ADMIN — ENOXAPARIN SODIUM 30 MILLIGRAM(S): 100 INJECTION SUBCUTANEOUS at 21:32

## 2025-04-24 RX ADMIN — Medication 975 MILLIGRAM(S): at 00:02

## 2025-04-24 RX ADMIN — LISINOPRIL 20 MILLIGRAM(S): 5 TABLET ORAL at 00:10

## 2025-04-24 RX ADMIN — SODIUM CHLORIDE 75 MILLILITER(S): 9 INJECTION, SOLUTION INTRAVENOUS at 08:14

## 2025-04-24 RX ADMIN — Medication 100 MICROGRAM(S): at 05:48

## 2025-04-24 RX ADMIN — Medication 975 MILLIGRAM(S): at 18:43

## 2025-04-24 RX ADMIN — Medication 2 TABLET(S): at 21:33

## 2025-04-24 RX ADMIN — Medication 975 MILLIGRAM(S): at 01:02

## 2025-04-24 RX ADMIN — Medication 25 GRAM(S): at 11:24

## 2025-04-24 RX ADMIN — LISINOPRIL 20 MILLIGRAM(S): 5 TABLET ORAL at 08:15

## 2025-04-24 RX ADMIN — Medication 975 MILLIGRAM(S): at 06:19

## 2025-04-24 RX ADMIN — Medication 500 MILLIGRAM(S): at 20:26

## 2025-04-24 RX ADMIN — Medication 975 MILLIGRAM(S): at 05:47

## 2025-04-24 RX ADMIN — SODIUM CHLORIDE 75 MILLILITER(S): 9 INJECTION, SOLUTION INTRAVENOUS at 00:02

## 2025-04-24 NOTE — BRIEF OPERATIVE NOTE - NSICDXBRIEFPROCEDURE_GEN_ALL_CORE_FT
PROCEDURES:  Debridement of wound using concurrent irrigation and suction device 24-Apr-2025 16:19:01  Mónica Hsu  Split-thickness autograft of left thigh 24-Apr-2025 16:21:31  Mónica Hsu

## 2025-04-24 NOTE — ASU PREOP CHECKLIST - DENTURES
PHYSICIAN NEXT STEPS:   Review Only      CHIEF COMPLAINT:   Chief Complaint/Protocol Used: Information Only Call - No Triage   Onset: Last night         ASSESSMENT:   Â» Did not know what to do True   Â» Onset: Last night   Â» Reason For Call: Fever   Â» Symptoms : Wheezing   Â» Other Questions: Denies   -------------------------------------------------------      DISPOSITION:   Disposition Recommendation: Home Care - Information or Advice Only Call   Questions that led to disposition:   Â» [1] Caller is not with the child AND [2] probable non-urgent symptoms AND [3] unable to complete triage    (NOTE: parent to call back with triage info)   Patient Directed To: Unspecified   Patient Intended Action: Seek care in the doctor's office          CALL NOTES:   02/13/2018 at 10:47 AM by Symone THOMAS» Patient not with caller(mother)   Appointment made for patient at [place] with EMERITA Ashby on 2/13/2018 at 1315.      DISPOSITION OVERRIDE/PROVIDER CONSULT:   Disposition Override: N/A   Override Source: Unspecified   Consulted with PCP: No   Consulted with On-Call Physician: No         CALLER CONTACT INFO:   Name: STELLA CARTER (Self)   Phone 1: [phone] (Home)   Phone 2: [phone] - Preferred         ENCOUNTER STARTED:   02/13/18 10:39:05 AM   ENCOUNTER ASSIGNED TO/CLOSED BY:   Symone Benjamin @ 02/13/18 10:47:55 AM         -------------------------------------------------------      UNDERSTANDS CARE ADVICE: No      AGREES WITH CARE ADVICE: No      WILL FOLLOW CARE ADVICE: No      -------------------------------------------------------  
yes

## 2025-04-24 NOTE — PROGRESS NOTE ADULT - ASSESSMENT
Assessment: 89 yo female s/p fall with degloving injury of the left lower extremity.     Plan:  - NPO today for surgery  - Pain control  - broad spectrum abx ppx

## 2025-04-24 NOTE — PROGRESS NOTE ADULT - ATTENDING COMMENTS
Pt w LLE large degloving injury of the left lower ext, for OR today w plastics  -preop labs done  -anesthesia eval  -pain control  -dvt prophylaxis     Clark Whipple MD FACS  Acute and Critical Care Surgery  Director of Emergency General Surgery @ [facility]  Associate  - General Surgery @ [facility]

## 2025-04-24 NOTE — BRIEF OPERATIVE NOTE - OPERATION/FINDINGS
Devitalized skin was resected until viable tissue was appreciated. Wounds were suction-irrigated with 3L NS, split thickness skin graft was obtained from the left thigh and applied to the superior 8x9cm wound and inferior 5x6cm wound. Skin graft was secured with the simple running sutures, covered with xeroform and black sponge wound vac was applied. The donor site was covered with xeroform that was secured with 8 simple interrupted sutures and covered with ABD pads. Both surgical sites were wrapped with an ACE wrap. Wound vac to 125mmHg. Dressings to not be removed until Friday, 5/2/25.

## 2025-04-24 NOTE — CONSULT NOTE ADULT - CONSULT REASON
Left lower extremity skin avulsion
[facility] Management
Full range of motion of upper and lower extremities, no joint tenderness/swelling.

## 2025-04-24 NOTE — CONSULT NOTE ADULT - ASSESSMENT
The patient is a 89 year old female with a past medical history of hypertension and hypothyroidism [place] [facility] [place]LE [place]. Patient states she was in her usual state of health when she was setting up her shower when she slipped and fell on her shower mat. She cut her LLE on the edge of the shower. Denies prodromal symptoms or syncope. Xrays of the ankle pelvis and chest were negative. Transferred to [facility] for plastic surgery evaluation for LLE de-gloving injury.     Assessment/Plan:    1. Mechanical fall resulting in LLE De gloving injury  - Pain controlled  - NPO for the OR today     2. Hypomagnesemia  - Supplement MgSO4  - Repeat Lytes in AM       3. Anemia  - Baseline Hb in 03/2025 of 10.5  - Check Iron panel, B12 and folate levels  - Repeat CBC in Am    4. Hypertension  - Home medication: Quinapril/ HCTZ 10/12.5 PO OD  - HCTZ held for hyponatremia  - BP elevated  - IV Hydralazine x 1 now  - Increase lisinopril to 20mg PO OD in AM   - Monitor BP closely    5. Hypothyroidism  - Continue Synthroid 100mcg PO OD     VTE_ SCDS     Geriatric Screening:    Functional Assessment: [ ] Independent [ x ] Assistance [ ] Total care [ ] Non-ambulatory    [ ] Fall risks identified: OA of the knees     [ ] High risk medications identified: None     [ ] Delirium and other risks can be reduced by:    -early ambulation    -minimizing "tethers" - IV, oxygen, catheters, etc    -avoiding hypnotics and sedatives    -maintaining hydration/nutrition    -avoid anticholinergics - diphenhydramine, etc    -pain control    -supportive environment    Advanced Directives: To be addressed by primary team      The patient is a 89 year old female with a past medical history of hypertension and hypothyroidism who [place] [facility] [place]LE [place]. Patient states she was in her usual state of health when she was setting up her shower when she slipped and fell on her shower mat. She cut her LLE on the edge of the shower. Denies prodromal symptoms or syncope. Xrays of the ankle pelvis and chest were negative. Transferred to [facility] for plastic surgery evaluation for LLE de-gloving injury.     Assessment/Plan:    1. Mechanical fall resulting in LLE De gloving injury  - Pain controlled  - NPO for the OR today     2. Hypomagnesemia  - Supplement MgSO4  - Repeat Lytes in AM       3. Anemia  - Baseline Hb in 03/2025 of 10.5  - Check Iron panel, B12 and folate levels  - Repeat CBC in Am    4. Hypertension  - Home medication: Quinapril/ HCTZ 10/12.5 PO OD  - HCTZ held for hyponatremia  - BP elevated  - Increase lisinopril to 40mg PO OD in AM   - Monitor BP closely  - If Na improves resume HCTZ in AM     5. Hypothyroidism  - Continue Synthroid 100mcg PO OD     VTE_ SCDS     Geriatric Screening:    Functional Assessment: [ ] Independent [ x ] Assistance [ ] Total care [ ] Non-ambulatory    [ ] Fall risks identified: OA of the knees     [ ] High risk medications identified: None     [ ] Delirium and other risks can be reduced by:    -early ambulation    -minimizing "tethers" - IV, oxygen, catheters, etc    -avoiding hypnotics and sedatives    -maintaining hydration/nutrition    -avoid anticholinergics - diphenhydramine, etc    -pain control    -supportive environment    Advanced Directives: To be addressed by primary team

## 2025-04-24 NOTE — CONSULT NOTE ADULT - SUBJECTIVE AND OBJECTIVE BOX
89 yo female trasferred to [facility] from [facility] a large skin avulsion of the left lower extremity. The patient fell on the shower earlier today and presented to the ED with skin avulsion of the left lower extremity at the lateral tibial area extending from the upper third of the leg extending to the distal third of the leg. Denies LOC, N/V/D/CP/SOB. Consult requested for wound reconstruction.     PMH: Hypothyroidism, Hypertension  PSH: Sinus surgery  ALL: NKDA  Soc: --    P/E:   AAO x 3 - HD Stable  Left lower extremity shows large skin avulsion measuring approximately 25 x 8 cm over the lateral aspect of the leg, wound is full thickness with anterior tibialis fascia exposed. Areas of devitilzed avulsed skin noted at the area. Mild active bleeding noted. No erythema, no hematomas, no signs of infection or collections. 
  Patient is a 89y old  Female who presents with a chief complaint of Degloving injury to LLE (24 Apr 2025 06:59)      HPI:    The patient is a 89 year old female with a past medical history of hypertension and hypothyroidism who was transferred from [facility] [place] plastic surgery evaluation of LLE injury. Patient states she was in her usual state of health when she was setting up her shower when she slipped and fell on her shower mat. She cut her LLE on the edge of the shower. Denies prodromal symptoms or syncope. Xrays of the ankle pelvis and chest were negative. Transferred to [facility] for plastic surgery evaluation for LLE de-gloving injury.     Patient seen and examined, laying comfortably in bed. Pain controlled.       PAST MEDICAL & SURGICAL HISTORY:  Hypertension  Hypothyroid  S/P eye surgery  S/P sinus surgery      Allergies: Allergies    No Known Allergies      Social History:  Tabacco - Denies   ETOH - Denies   Illicit drug abuse - denies    FAMILY HISTORY:      Allergies    No Known Allergies        HOME MEDICATIONS :   Co Q-10 100 mg oral capsule: 1 cap(s) orally once a day (11 Jun 2018 08:52)  levothyroxine 100 mcg (0.1 mg) oral tablet: 1 tab(s) orally once a day (11 Jun 2018 08:52)  quinapril-hydrochlorothiazide 20mg-12.5mg oral tablet: 1 tab(s) orally once a day (11 Jun 2018 08:52)  Vitamin C, E, and Neha Hips oral capsule: 1 cap(s) orally once a day (11 Jun 2018 08:52)  Vitamin D3 1000 intl units oral tablet: 1 tab(s) orally once a day (11 Jun 2018 08:52)    REVIEW OF SYSTEMS:    CONSTITUTIONAL: No fever, weight loss, or fatigue  EYES: No eye pain, visual disturbances, or discharge  NECK: No pain or stiffness  RESPIRATORY: No cough, wheezing, chills or hemoptysis; No shortness of breath  CARDIOVASCULAR: No chest pain, palpitations, dizziness, or leg swelling  GASTROINTESTINAL: No abdominal or epigastric pain. No nausea, vomiting, or hematemesis; No diarrhea or constipation. No melena or hematochezia.  GENITOURINARY: No dysuria, frequency, hematuria, or incontinence  NEUROLOGICAL: No headaches, memory loss, loss of strength, numbness, or tremors  SKIN: LLE injury     MEDICATIONS  (STANDING):  acetaminophen     Tablet .. 975 milliGRAM(s) Oral every 6 hours  enoxaparin Injectable 40 milliGRAM(s) SubCutaneous every 24 hours  influenza  Vaccine (HIGH DOSE) 0.5 milliLiter(s) IntraMuscular once  lactated ringers. 1000 milliLiter(s) (75 mL/Hr) IV Continuous <Continuous>  levothyroxine 100 MICROGram(s) Oral daily  lisinopril 20 milliGRAM(s) Oral daily  senna 2 Tablet(s) Oral at bedtime    MEDICATIONS  (PRN):  HYDROmorphone  Injectable 0.5 milliGRAM(s) IV Push every 3 hours PRN Severe Pain (7 - 10)  ibuprofen  Tablet. 600 milliGRAM(s) Oral every 6 hours PRN Mild Pain (1 - 3)      Vital Signs Last 24 Hrs  T(C): 36.5 (24 Apr 2025 08:33), Max: 36.7 (23 Apr 2025 21:33)  T(F): 97.7 (24 Apr 2025 08:33), Max: 98 (23 Apr 2025 21:33)  HR: 83 (24 Apr 2025 08:33) (75 - 100)  BP: 172/64 (24 Apr 2025 08:33) (146/70 - 172/64)  BP(mean): --  RR: 18 (24 Apr 2025 08:33) (16 - 18)  SpO2: 93% (24 Apr 2025 08:33) (92% - 97%)    Parameters below as of 24 Apr 2025 08:33  Patient On (Oxygen Delivery Method): room air        PHYSICAL EXAM:    GENERAL: NAD, AOX4  HEAD:  Atraumatic, Normocephalic  EYES:  conjunctiva and sclera clear  NECK: Supple, Normal thyroid  NERVOUS SYSTEM:  Alert & Oriented X3, Good concentration; Motor Strength 5/5 B/L upper and lower extremities  CHEST/LUNG: CTA  b/l,  no rales, rhonchi, wheezing, or rubs  HEART: Regular rate and rhythm; No murmurs, rubs, or gallops  ABDOMEN: Soft, Nontender, Nondistended; Bowel sounds present  EXTREMITIES:  2+ Peripheral Pulses, No clubbing, cyanosis, or edema ,   LLE dressing in place    LABS:                        8.4    5.91  )-----------( 309      ( 24 Apr 2025 06:43 )             26.1     04-24    135  |  99  |  19.5  ----------------------------<  99  4.6   |  25.0  |  1.13    Ca    9.3      24 Apr 2025 06:43  Phos  3.0     04-24  Mg     1.6     04-24    TPro  5.8[L]  /  Alb  3.3  /  TBili  0.5  /  DBili  x   /  AST  19  /  ALT  6   /  AlkPhos  61  04-24    PT/INR - ( 23 Apr 2025 21:35 )   PT: 12.0 sec;   INR: 1.03 ratio         PTT - ( 23 Apr 2025 21:35 )  PTT:27.2 sec  Urinalysis Basic - ( 24 Apr 2025 06:43 )    Color: x / Appearance: x / SG: x / pH: x  Gluc: 99 mg/dL / Ketone: x  / Bili: x / Urobili: x   Blood: x / Protein: x / Nitrite: x   Leuk Esterase: x / RBC: x / WBC x   Sq Epi: x / Non Sq Epi: x / Bacteria: x          RADIOLOGY & ADDITIONAL STUDIES:

## 2025-04-25 LAB
ANION GAP SERPL CALC-SCNC: 10 MMOL/L — SIGNIFICANT CHANGE UP (ref 5–17)
ANION GAP SERPL CALC-SCNC: 12 MMOL/L — SIGNIFICANT CHANGE UP (ref 5–17)
BASOPHILS # BLD AUTO: 0.01 K/UL — SIGNIFICANT CHANGE UP (ref 0–0.2)
BASOPHILS NFR BLD AUTO: 0.1 % — SIGNIFICANT CHANGE UP (ref 0–2)
BUN SERPL-MCNC: 20.7 MG/DL — HIGH (ref 8–20)
BUN SERPL-MCNC: 27.3 MG/DL — HIGH (ref 8–20)
CALCIUM SERPL-MCNC: 8.2 MG/DL — LOW (ref 8.4–10.5)
CALCIUM SERPL-MCNC: 8.6 MG/DL — SIGNIFICANT CHANGE UP (ref 8.4–10.5)
CHLORIDE SERPL-SCNC: 95 MMOL/L — LOW (ref 96–108)
CHLORIDE SERPL-SCNC: 96 MMOL/L — SIGNIFICANT CHANGE UP (ref 96–108)
CO2 SERPL-SCNC: 24 MMOL/L — SIGNIFICANT CHANGE UP (ref 22–29)
CO2 SERPL-SCNC: 24 MMOL/L — SIGNIFICANT CHANGE UP (ref 22–29)
CREAT SERPL-MCNC: 1.24 MG/DL — SIGNIFICANT CHANGE UP (ref 0.5–1.3)
CREAT SERPL-MCNC: 1.6 MG/DL — HIGH (ref 0.5–1.3)
EGFR: 29 ML/MIN/1.73M2 — LOW
EGFR: 29 ML/MIN/1.73M2 — LOW
EGFR: 40 ML/MIN/1.73M2 — LOW
EGFR: 40 ML/MIN/1.73M2 — LOW
EOSINOPHIL # BLD AUTO: 0 K/UL — SIGNIFICANT CHANGE UP (ref 0–0.5)
EOSINOPHIL NFR BLD AUTO: 0 % — SIGNIFICANT CHANGE UP (ref 0–6)
FERRITIN SERPL-MCNC: 24 NG/ML — SIGNIFICANT CHANGE UP (ref 13–330)
FOLATE SERPL-MCNC: 10.9 NG/ML — SIGNIFICANT CHANGE UP
GLUCOSE SERPL-MCNC: 108 MG/DL — HIGH (ref 70–99)
GLUCOSE SERPL-MCNC: 138 MG/DL — HIGH (ref 70–99)
HAPTOGLOB SERPL-MCNC: 109 MG/DL — SIGNIFICANT CHANGE UP (ref 34–200)
HCT VFR BLD CALC: 22 % — LOW (ref 34.5–45)
HCT VFR BLD CALC: 25.8 % — LOW (ref 34.5–45)
HGB BLD-MCNC: 7.1 G/DL — LOW (ref 11.5–15.5)
HGB BLD-MCNC: 8 G/DL — LOW (ref 11.5–15.5)
IMM GRANULOCYTES # BLD AUTO: 0.02 K/UL — SIGNIFICANT CHANGE UP (ref 0–0.07)
IMM GRANULOCYTES NFR BLD AUTO: 0.3 % — SIGNIFICANT CHANGE UP (ref 0–0.9)
IRON SATN MFR SERPL: 18 UG/DL — LOW (ref 37–145)
IRON SATN MFR SERPL: 5 % — LOW (ref 14–50)
LDH SERPL L TO P-CCNC: 156 U/L — SIGNIFICANT CHANGE UP (ref 98–192)
LYMPHOCYTES # BLD AUTO: 0.6 K/UL — LOW (ref 1–3.3)
LYMPHOCYTES NFR BLD AUTO: 8.1 % — LOW (ref 13–44)
MAGNESIUM SERPL-MCNC: 2 MG/DL — SIGNIFICANT CHANGE UP (ref 1.6–2.6)
MAGNESIUM SERPL-MCNC: 2 MG/DL — SIGNIFICANT CHANGE UP (ref 1.6–2.6)
MCHC RBC-ENTMCNC: 26.1 PG — LOW (ref 27–34)
MCHC RBC-ENTMCNC: 27 PG — SIGNIFICANT CHANGE UP (ref 27–34)
MCHC RBC-ENTMCNC: 31 G/DL — LOW (ref 32–36)
MCHC RBC-ENTMCNC: 32.3 G/DL — SIGNIFICANT CHANGE UP (ref 32–36)
MCV RBC AUTO: 83.7 FL — SIGNIFICANT CHANGE UP (ref 80–100)
MCV RBC AUTO: 84 FL — SIGNIFICANT CHANGE UP (ref 80–100)
MONOCYTES # BLD AUTO: 0.71 K/UL — SIGNIFICANT CHANGE UP (ref 0–0.9)
MONOCYTES NFR BLD AUTO: 9.6 % — SIGNIFICANT CHANGE UP (ref 2–14)
NEUTROPHILS # BLD AUTO: 6.03 K/UL — SIGNIFICANT CHANGE UP (ref 1.8–7.4)
NEUTROPHILS NFR BLD AUTO: 81.9 % — HIGH (ref 43–77)
NRBC # BLD AUTO: 0 K/UL — SIGNIFICANT CHANGE UP (ref 0–0)
NRBC # BLD AUTO: 0 K/UL — SIGNIFICANT CHANGE UP (ref 0–0)
NRBC # FLD: 0 K/UL — SIGNIFICANT CHANGE UP (ref 0–0)
NRBC # FLD: 0 K/UL — SIGNIFICANT CHANGE UP (ref 0–0)
NRBC BLD AUTO-RTO: 0 /100 WBCS — SIGNIFICANT CHANGE UP (ref 0–0)
NRBC BLD AUTO-RTO: 0 /100 WBCS — SIGNIFICANT CHANGE UP (ref 0–0)
PHOSPHATE SERPL-MCNC: 3.2 MG/DL — SIGNIFICANT CHANGE UP (ref 2.4–4.7)
PHOSPHATE SERPL-MCNC: 4.5 MG/DL — SIGNIFICANT CHANGE UP (ref 2.4–4.7)
PLATELET # BLD AUTO: 288 K/UL — SIGNIFICANT CHANGE UP (ref 150–400)
PLATELET # BLD AUTO: 316 K/UL — SIGNIFICANT CHANGE UP (ref 150–400)
PMV BLD: 9.1 FL — SIGNIFICANT CHANGE UP (ref 7–13)
PMV BLD: 9.3 FL — SIGNIFICANT CHANGE UP (ref 7–13)
POTASSIUM SERPL-MCNC: 4.9 MMOL/L — SIGNIFICANT CHANGE UP (ref 3.5–5.3)
POTASSIUM SERPL-MCNC: 5.3 MMOL/L — SIGNIFICANT CHANGE UP (ref 3.5–5.3)
POTASSIUM SERPL-SCNC: 4.9 MMOL/L — SIGNIFICANT CHANGE UP (ref 3.5–5.3)
POTASSIUM SERPL-SCNC: 5.3 MMOL/L — SIGNIFICANT CHANGE UP (ref 3.5–5.3)
RBC # BLD: 2.63 M/UL — LOW (ref 3.8–5.2)
RBC # BLD: 3.07 M/UL — LOW (ref 3.8–5.2)
RBC # FLD: 16.3 % — HIGH (ref 10.3–14.5)
RBC # FLD: 16.3 % — HIGH (ref 10.3–14.5)
SODIUM SERPL-SCNC: 129 MMOL/L — LOW (ref 135–145)
SODIUM SERPL-SCNC: 131 MMOL/L — LOW (ref 135–145)
TIBC SERPL-MCNC: 365 UG/DL — SIGNIFICANT CHANGE UP (ref 220–430)
TRANSFERRIN SERPL-MCNC: 255 MG/DL — SIGNIFICANT CHANGE UP (ref 192–382)
TSH SERPL-MCNC: 0.24 UIU/ML — LOW (ref 0.27–4.2)
VIT B12 SERPL-MCNC: 588 PG/ML — SIGNIFICANT CHANGE UP (ref 232–1245)
WBC # BLD: 7.37 K/UL — SIGNIFICANT CHANGE UP (ref 3.8–10.5)
WBC # BLD: 8.69 K/UL — SIGNIFICANT CHANGE UP (ref 3.8–10.5)
WBC # FLD AUTO: 7.37 K/UL — SIGNIFICANT CHANGE UP (ref 3.8–10.5)
WBC # FLD AUTO: 8.69 K/UL — SIGNIFICANT CHANGE UP (ref 3.8–10.5)

## 2025-04-25 PROCEDURE — 99232 SBSQ HOSP IP/OBS MODERATE 35: CPT

## 2025-04-25 RX ORDER — POLYETHYLENE GLYCOL 3350 17 G/17G
17 POWDER, FOR SOLUTION ORAL DAILY
Refills: 0 | Status: DISCONTINUED | OUTPATIENT
Start: 2025-04-25 | End: 2025-05-02

## 2025-04-25 RX ORDER — LISINOPRIL 5 MG/1
40 TABLET ORAL DAILY
Refills: 0 | Status: DISCONTINUED | OUTPATIENT
Start: 2025-04-25 | End: 2025-04-26

## 2025-04-25 RX ORDER — METHOCARBAMOL 500 MG/1
500 TABLET, FILM COATED ORAL EVERY 6 HOURS
Refills: 0 | Status: DISCONTINUED | OUTPATIENT
Start: 2025-04-25 | End: 2025-05-02

## 2025-04-25 RX ORDER — MELATONIN 5 MG
5 TABLET ORAL AT BEDTIME
Refills: 0 | Status: DISCONTINUED | OUTPATIENT
Start: 2025-04-25 | End: 2025-05-02

## 2025-04-25 RX ORDER — CYST/ALA/Q10/PHOS.SER/DHA/BROC 100-20-50
1 POWDER (GRAM) ORAL DAILY
Refills: 0 | Status: DISCONTINUED | OUTPATIENT
Start: 2025-04-25 | End: 2025-05-02

## 2025-04-25 RX ADMIN — METHOCARBAMOL 500 MILLIGRAM(S): 500 TABLET, FILM COATED ORAL at 20:08

## 2025-04-25 RX ADMIN — Medication 975 MILLIGRAM(S): at 13:34

## 2025-04-25 RX ADMIN — LISINOPRIL 40 MILLIGRAM(S): 5 TABLET ORAL at 13:34

## 2025-04-25 RX ADMIN — Medication 600 MILLIGRAM(S): at 21:48

## 2025-04-25 RX ADMIN — Medication 500 MILLIGRAM(S): at 13:34

## 2025-04-25 RX ADMIN — Medication 500 MILLILITER(S): at 19:08

## 2025-04-25 RX ADMIN — Medication 600 MILLIGRAM(S): at 22:48

## 2025-04-25 RX ADMIN — Medication 5 MILLIGRAM(S): at 21:48

## 2025-04-25 RX ADMIN — Medication 975 MILLIGRAM(S): at 14:34

## 2025-04-25 RX ADMIN — LISINOPRIL 40 MILLIGRAM(S): 5 TABLET ORAL at 05:56

## 2025-04-25 RX ADMIN — Medication 975 MILLIGRAM(S): at 17:55

## 2025-04-25 RX ADMIN — Medication 100 MICROGRAM(S): at 05:57

## 2025-04-25 RX ADMIN — Medication 1 TABLET(S): at 13:34

## 2025-04-25 RX ADMIN — Medication 975 MILLIGRAM(S): at 05:57

## 2025-04-25 RX ADMIN — ENOXAPARIN SODIUM 30 MILLIGRAM(S): 100 INJECTION SUBCUTANEOUS at 21:48

## 2025-04-25 RX ADMIN — Medication 500 MILLIGRAM(S): at 04:25

## 2025-04-25 RX ADMIN — Medication 75 MILLILITER(S): at 17:55

## 2025-04-25 RX ADMIN — Medication 975 MILLIGRAM(S): at 06:46

## 2025-04-25 RX ADMIN — Medication 975 MILLIGRAM(S): at 18:55

## 2025-04-25 NOTE — PROGRESS NOTE ADULT - ATTENDING COMMENTS
Pt underwent debridement and skin grafting of LLE yesterday, doing well.  Dressing changed this am for sligh saturation of ACE wrap.   foot/leg without edema, good function, sensation intact.   Home meds  pain control  will start setting up VNS for home care    Clark Whipple MD FACS  Acute and Critical Care Surgery  Director of Emergency General Surgery @ [facility]  Associate  - General Surgery @ [facility]

## 2025-04-25 NOTE — PHYSICAL THERAPY INITIAL EVALUATION ADULT - ADDITIONAL COMMENTS
Pt reports independent PTA, uses rollator "most of time", no device for short distances within home. Pt owns rollator, SAC, shower chair. Has supportive sister who lives close by; assists with cooking, shopping etc.

## 2025-04-25 NOTE — PROGRESS NOTE ADULT - ASSESSMENT
The patient is a 89 year old female with a past medical history of hypertension and hypothyroidism who [place] [facility] [place]. Patient states she was in her usual state of health when she was setting up her shower when she slipped and fell on her shower mat. She cut her LLE on the edge of the shower. Denies prodromal symptoms or syncope. Xrays of the ankle pelvis and chest were negative. Transferred to [facility] for plastic surgery evaluation for LLE de-gloving injury.  Status post debridement with split thickness autograft of the left thigh, Wound vac placed on 4/24      Assessment/Plan:    1. Mechanical fall resulting in LLE De gloving injury  - Status post debridement with split thickness autograft of the left thigh, Wound vac placed on 4/24  - Per plastics, dressings to stay in place until 5/2   - Pain controlled    2. Hypomagnesemia  - Repleted     3. Anemia  - Baseline Hb in 03/2025 of 10.5  - Suspected blood loss from injury?  - B12, folate and LDH WNL  - Low iron and Iron sat  - Monitor CBC: tranfuse hb <7.0    4. Hypertension  - Home medication: Quinapril/ HCTZ 10/12.5 PO OD  - HCTZ held for hyponatremia  - Increased lisinopril to 40mg PO OD in AM   - Monitor BP closely    5. Hypothyroidism  - TSH is 0.24; check T3 and T4   - Continue Synthroid 100mcg PO OD     VTE_ SCDS     Geriatric Screening:    Functional Assessment: [ ] Independent [ x ] Assistance [ ] Total care [ ] Non-ambulatory    [ ] Fall risks identified: OA of the knees     [ ] High risk medications identified: None     [ ] Delirium and other risks can be reduced by:    -early ambulation    -minimizing "tethers" - IV, oxygen, catheters, etc    -avoiding hypnotics and sedatives    -maintaining hydration/nutrition    -avoid anticholinergics - diphenhydramine, etc    -pain control    -supportive environment    Advanced Directives: To be addressed by primary team

## 2025-04-25 NOTE — PHYSICAL THERAPY INITIAL EVALUATION ADULT - RANGE OF MOTION EXAMINATION, REHAB EVAL
left knee NA, hip/ankle WFL/Left LE ROM was WFL (within functional limits)/Right LE ROM was WFL (within functional limits)/deficits as listed below

## 2025-04-25 NOTE — PROGRESS NOTE ADULT - ASSESSMENT
89 yo F s/p LLE wound debridement and Split-thickness autograft placement. POD1. Recovering well post-op. Tetanus was administered in [facility] prior to transfer to [facility].     Plan:  - wound vac to be kept in place until 5/2  - L thigh donor site dressing to be kept in place until 5/2  - NWB to LLE  - WV in place to 125mmHg  - notify Dr. Ross if WV/donor site issues  - TTS today  - f/u PT  - f/u OT  - f/u am labs  - Ancef to finish today   - geriatric eval done 4/24 - appreciate recs  - no SCDs to LLE, Lovenox 30 qd  - IS education and use

## 2025-04-25 NOTE — PHYSICAL THERAPY INITIAL EVALUATION ADULT - PERTINENT HX OF CURRENT PROBLEM, REHAB EVAL
The patient is a 89 year old female with a past medical history of hypertension and hypothyroidism who was transferred from [facility] for plastic surgery evaluation of LLE injury. Pt slipped in shower, cutting her leg on shower chair. Now s/p Left LE I&D with autograph placement 4/24

## 2025-04-26 LAB
ANION GAP SERPL CALC-SCNC: 10 MMOL/L — SIGNIFICANT CHANGE UP (ref 5–17)
BASOPHILS # BLD AUTO: 0.05 K/UL — SIGNIFICANT CHANGE UP (ref 0–0.2)
BASOPHILS NFR BLD AUTO: 0.7 % — SIGNIFICANT CHANGE UP (ref 0–2)
BUN SERPL-MCNC: 26.5 MG/DL — HIGH (ref 8–20)
CALCIUM SERPL-MCNC: 8.1 MG/DL — LOW (ref 8.4–10.5)
CHLORIDE SERPL-SCNC: 96 MMOL/L — SIGNIFICANT CHANGE UP (ref 96–108)
CO2 SERPL-SCNC: 23 MMOL/L — SIGNIFICANT CHANGE UP (ref 22–29)
CREAT SERPL-MCNC: 1.6 MG/DL — HIGH (ref 0.5–1.3)
EGFR: 29 ML/MIN/1.73M2 — LOW
EGFR: 29 ML/MIN/1.73M2 — LOW
EOSINOPHIL # BLD AUTO: 0.11 K/UL — SIGNIFICANT CHANGE UP (ref 0–0.5)
EOSINOPHIL NFR BLD AUTO: 1.6 % — SIGNIFICANT CHANGE UP (ref 0–6)
GLUCOSE SERPL-MCNC: 85 MG/DL — SIGNIFICANT CHANGE UP (ref 70–99)
HCT VFR BLD CALC: 21.8 % — LOW (ref 34.5–45)
HCT VFR BLD CALC: 27 % — LOW (ref 34.5–45)
HGB BLD-MCNC: 7 G/DL — CRITICAL LOW (ref 11.5–15.5)
HGB BLD-MCNC: 8.7 G/DL — LOW (ref 11.5–15.5)
IMM GRANULOCYTES # BLD AUTO: 0.02 K/UL — SIGNIFICANT CHANGE UP (ref 0–0.07)
IMM GRANULOCYTES NFR BLD AUTO: 0.3 % — SIGNIFICANT CHANGE UP (ref 0–0.9)
LYMPHOCYTES # BLD AUTO: 1.35 K/UL — SIGNIFICANT CHANGE UP (ref 1–3.3)
LYMPHOCYTES NFR BLD AUTO: 19.9 % — SIGNIFICANT CHANGE UP (ref 13–44)
MAGNESIUM SERPL-MCNC: 1.8 MG/DL — SIGNIFICANT CHANGE UP (ref 1.6–2.6)
MCHC RBC-ENTMCNC: 27.2 PG — SIGNIFICANT CHANGE UP (ref 27–34)
MCHC RBC-ENTMCNC: 27.2 PG — SIGNIFICANT CHANGE UP (ref 27–34)
MCHC RBC-ENTMCNC: 32.1 G/DL — SIGNIFICANT CHANGE UP (ref 32–36)
MCHC RBC-ENTMCNC: 32.2 G/DL — SIGNIFICANT CHANGE UP (ref 32–36)
MCV RBC AUTO: 84.4 FL — SIGNIFICANT CHANGE UP (ref 80–100)
MCV RBC AUTO: 84.8 FL — SIGNIFICANT CHANGE UP (ref 80–100)
MONOCYTES # BLD AUTO: 0.97 K/UL — HIGH (ref 0–0.9)
MONOCYTES NFR BLD AUTO: 14.3 % — HIGH (ref 2–14)
NEUTROPHILS # BLD AUTO: 4.27 K/UL — SIGNIFICANT CHANGE UP (ref 1.8–7.4)
NEUTROPHILS NFR BLD AUTO: 63.2 % — SIGNIFICANT CHANGE UP (ref 43–77)
NRBC # BLD AUTO: 0 K/UL — SIGNIFICANT CHANGE UP (ref 0–0)
NRBC # BLD AUTO: 0 K/UL — SIGNIFICANT CHANGE UP (ref 0–0)
NRBC # FLD: 0 K/UL — SIGNIFICANT CHANGE UP (ref 0–0)
NRBC # FLD: 0 K/UL — SIGNIFICANT CHANGE UP (ref 0–0)
NRBC BLD AUTO-RTO: 0 /100 WBCS — SIGNIFICANT CHANGE UP (ref 0–0)
NRBC BLD AUTO-RTO: 0 /100 WBCS — SIGNIFICANT CHANGE UP (ref 0–0)
PHOSPHATE SERPL-MCNC: 2.9 MG/DL — SIGNIFICANT CHANGE UP (ref 2.4–4.7)
PLATELET # BLD AUTO: 306 K/UL — SIGNIFICANT CHANGE UP (ref 150–400)
PLATELET # BLD AUTO: 314 K/UL — SIGNIFICANT CHANGE UP (ref 150–400)
PMV BLD: 9.3 FL — SIGNIFICANT CHANGE UP (ref 7–13)
PMV BLD: 9.4 FL — SIGNIFICANT CHANGE UP (ref 7–13)
POTASSIUM SERPL-MCNC: 5.1 MMOL/L — SIGNIFICANT CHANGE UP (ref 3.5–5.3)
POTASSIUM SERPL-SCNC: 5.1 MMOL/L — SIGNIFICANT CHANGE UP (ref 3.5–5.3)
RBC # BLD: 2.57 M/UL — LOW (ref 3.8–5.2)
RBC # BLD: 3.2 M/UL — LOW (ref 3.8–5.2)
RBC # FLD: 16.1 % — HIGH (ref 10.3–14.5)
RBC # FLD: 16.5 % — HIGH (ref 10.3–14.5)
SODIUM SERPL-SCNC: 129 MMOL/L — LOW (ref 135–145)
WBC # BLD: 6.77 K/UL — SIGNIFICANT CHANGE UP (ref 3.8–10.5)
WBC # BLD: 6.92 K/UL — SIGNIFICANT CHANGE UP (ref 3.8–10.5)
WBC # FLD AUTO: 6.77 K/UL — SIGNIFICANT CHANGE UP (ref 3.8–10.5)
WBC # FLD AUTO: 6.92 K/UL — SIGNIFICANT CHANGE UP (ref 3.8–10.5)

## 2025-04-26 PROCEDURE — 99233 SBSQ HOSP IP/OBS HIGH 50: CPT

## 2025-04-26 PROCEDURE — 99231 SBSQ HOSP IP/OBS SF/LOW 25: CPT

## 2025-04-26 RX ORDER — SODIUM PHOSPHATE,DIBASIC DIHYD
15 POWDER (GRAM) MISCELLANEOUS ONCE
Refills: 0 | Status: COMPLETED | OUTPATIENT
Start: 2025-04-26 | End: 2025-04-26

## 2025-04-26 RX ORDER — MAGNESIUM SULFATE 500 MG/ML
2 SYRINGE (ML) INJECTION ONCE
Refills: 0 | Status: COMPLETED | OUTPATIENT
Start: 2025-04-26 | End: 2025-04-26

## 2025-04-26 RX ADMIN — Medication 2 TABLET(S): at 21:52

## 2025-04-26 RX ADMIN — Medication 1 TABLET(S): at 12:41

## 2025-04-26 RX ADMIN — Medication 600 MILLIGRAM(S): at 22:54

## 2025-04-26 RX ADMIN — METHOCARBAMOL 500 MILLIGRAM(S): 500 TABLET, FILM COATED ORAL at 12:40

## 2025-04-26 RX ADMIN — Medication 600 MILLIGRAM(S): at 21:54

## 2025-04-26 RX ADMIN — ENOXAPARIN SODIUM 30 MILLIGRAM(S): 100 INJECTION SUBCUTANEOUS at 21:52

## 2025-04-26 RX ADMIN — Medication 100 MICROGRAM(S): at 05:45

## 2025-04-26 RX ADMIN — Medication 5 MILLIGRAM(S): at 21:52

## 2025-04-26 RX ADMIN — Medication 63.75 MILLIMOLE(S): at 12:41

## 2025-04-26 RX ADMIN — Medication 25 GRAM(S): at 09:33

## 2025-04-26 NOTE — PROGRESS NOTE ADULT - ASSESSMENT
The patient is a 89 year old female with a past medical history of hypertension and hypothyroidism who was transferred from [facility] [place] for plastic surgery evaluation of LLE injury. Patient states she was in her usual state of health when she was setting up her shower when she slipped and fell on her shower mat. She cut her LLE on the edge of the shower. Denies prodromal symptoms or syncope. Xrays of the ankle pelvis and chest were negative. Transferred to [facility] for plastic surgery evaluation for LLE de-gloving injury.  Status post debridement with split thickness autograft of the left thigh, Wound vac placed on 4/24      Assessment/Plan:    1. Mechanical fall resulting in LLE De gloving injury  - Status post debridement with split thickness autograft of the left thigh, Wound vac placed on 4/24  - Per plastics, dressings to stay in place until 5/2   - Pain control   - dvt prophylaxis     2. Hypomagnesemia  - Repleted     3. Anemia  - Baseline Hb in 03/2025 of 10.5  - Suspected blood loss from injury?  - B12, folate and LDH WNL  - Low iron and Iron sat  - today's Hg 7 , BP on lower side, 1 U PRBC given   - ANEMIA W/U ORDERED BY ME       4. Hypertension- this am BP on lower side   -  Quinapril/ HCTZ 10/12.5 PO OD- hold for now due to renal failure and hypotension   - Monitor BP closely  - May give Hydralazine ivp 10 mg Q 6 hrs for SBP > 160 for now     5. Hypothyroidism  - TSH is 0.24; repeat TSH , check T3 and T4 - in am - ORDERED BY ME   - Continue Synthroid 100mcg PO Daily for now     6. Hyponatremia - Na 129 - likely due to hypovolemia , 1 U PRBC given -   repeat Na level     7. Acute renal failure - likely due to anemia / hypovolemia - 1U PRBC given ,   BP post transfusion better controlled , SBP - 130 .   Avoid NSAID's - consider to d/c Ibuprofen , hold ACE/ARBS/ HCTZ for now ,   Check BMP in am - ORDERED BY ME     VTE_ SCDS     Geriatric Screening:    Functional Assessment: [ ] Independent [ x ] Assistance [ ] Total care [ ] Non-ambulatory    X[ ] Fall risks identified: OA of the knees     [ ] High risk medications identified: None     [X ] Delirium and other risks can be reduced by:    -early ambulation    -minimizing "tethers" - IV, oxygen, catheters, etc    -avoiding hypnotics and sedatives    -maintaining hydration/nutrition    -avoid anticholinergics - diphenhydramine, etc    -pain control    -supportive environment    Advanced Directives: To be addressed by primary team     Will follow along with you .

## 2025-04-27 LAB
ANION GAP SERPL CALC-SCNC: 11 MMOL/L — SIGNIFICANT CHANGE UP (ref 5–17)
BUN SERPL-MCNC: 22.6 MG/DL — HIGH (ref 8–20)
CALCIUM SERPL-MCNC: 8.9 MG/DL — SIGNIFICANT CHANGE UP (ref 8.4–10.5)
CHLORIDE SERPL-SCNC: 98 MMOL/L — SIGNIFICANT CHANGE UP (ref 96–108)
CO2 SERPL-SCNC: 25 MMOL/L — SIGNIFICANT CHANGE UP (ref 22–29)
CREAT SERPL-MCNC: 1.12 MG/DL — SIGNIFICANT CHANGE UP (ref 0.5–1.3)
EGFR: 45 ML/MIN/1.73M2 — LOW
EGFR: 45 ML/MIN/1.73M2 — LOW
FOLATE SERPL-MCNC: 10.9 NG/ML — SIGNIFICANT CHANGE UP
GLUCOSE SERPL-MCNC: 91 MG/DL — SIGNIFICANT CHANGE UP (ref 70–99)
HCT VFR BLD CALC: 27.6 % — LOW (ref 34.5–45)
HGB BLD-MCNC: 9 G/DL — LOW (ref 11.5–15.5)
IRON SATN MFR SERPL: 24 UG/DL — LOW (ref 37–145)
IRON SATN MFR SERPL: 6 % — LOW (ref 14–50)
MAGNESIUM SERPL-MCNC: 2.1 MG/DL — SIGNIFICANT CHANGE UP (ref 1.6–2.6)
MCHC RBC-ENTMCNC: 27.4 PG — SIGNIFICANT CHANGE UP (ref 27–34)
MCHC RBC-ENTMCNC: 32.6 G/DL — SIGNIFICANT CHANGE UP (ref 32–36)
MCV RBC AUTO: 84.1 FL — SIGNIFICANT CHANGE UP (ref 80–100)
NRBC # BLD AUTO: 0 K/UL — SIGNIFICANT CHANGE UP (ref 0–0)
NRBC # FLD: 0 K/UL — SIGNIFICANT CHANGE UP (ref 0–0)
NRBC BLD AUTO-RTO: 0 /100 WBCS — SIGNIFICANT CHANGE UP (ref 0–0)
PHOSPHATE SERPL-MCNC: 3.2 MG/DL — SIGNIFICANT CHANGE UP (ref 2.4–4.7)
PLATELET # BLD AUTO: 316 K/UL — SIGNIFICANT CHANGE UP (ref 150–400)
PMV BLD: 9.6 FL — SIGNIFICANT CHANGE UP (ref 7–13)
POTASSIUM SERPL-MCNC: 4.7 MMOL/L — SIGNIFICANT CHANGE UP (ref 3.5–5.3)
POTASSIUM SERPL-SCNC: 4.7 MMOL/L — SIGNIFICANT CHANGE UP (ref 3.5–5.3)
RBC # BLD: 3.28 M/UL — LOW (ref 3.8–5.2)
RBC # FLD: 16.4 % — HIGH (ref 10.3–14.5)
SODIUM SERPL-SCNC: 133 MMOL/L — LOW (ref 135–145)
T3FREE SERPL-MCNC: 1.81 PG/ML — LOW (ref 2–4.4)
T4 FREE SERPL-MCNC: 1.5 NG/DL — SIGNIFICANT CHANGE UP (ref 0.9–1.8)
TIBC SERPL-MCNC: 380 UG/DL — SIGNIFICANT CHANGE UP (ref 220–430)
TRANSFERRIN SERPL-MCNC: 266 MG/DL — SIGNIFICANT CHANGE UP (ref 192–382)
TSH SERPL-MCNC: 0.45 UIU/ML — SIGNIFICANT CHANGE UP (ref 0.27–4.2)
VIT B12 SERPL-MCNC: 870 PG/ML — SIGNIFICANT CHANGE UP (ref 232–1245)
WBC # BLD: 5.52 K/UL — SIGNIFICANT CHANGE UP (ref 3.8–10.5)
WBC # FLD AUTO: 5.52 K/UL — SIGNIFICANT CHANGE UP (ref 3.8–10.5)

## 2025-04-27 PROCEDURE — 99232 SBSQ HOSP IP/OBS MODERATE 35: CPT

## 2025-04-27 PROCEDURE — 99231 SBSQ HOSP IP/OBS SF/LOW 25: CPT

## 2025-04-27 RX ORDER — LISINOPRIL 5 MG/1
20 TABLET ORAL DAILY
Refills: 0 | Status: DISCONTINUED | OUTPATIENT
Start: 2025-04-27 | End: 2025-05-02

## 2025-04-27 RX ADMIN — Medication 100 MICROGRAM(S): at 04:57

## 2025-04-27 RX ADMIN — ENOXAPARIN SODIUM 30 MILLIGRAM(S): 100 INJECTION SUBCUTANEOUS at 21:09

## 2025-04-27 RX ADMIN — Medication 75 MILLILITER(S): at 15:44

## 2025-04-27 RX ADMIN — Medication 600 MILLIGRAM(S): at 23:07

## 2025-04-27 RX ADMIN — Medication 975 MILLIGRAM(S): at 04:56

## 2025-04-27 RX ADMIN — Medication 975 MILLIGRAM(S): at 17:11

## 2025-04-27 RX ADMIN — Medication 75 MILLILITER(S): at 21:13

## 2025-04-27 RX ADMIN — Medication 10 MILLIGRAM(S): at 18:16

## 2025-04-27 RX ADMIN — Medication 5 MILLIGRAM(S): at 21:10

## 2025-04-27 RX ADMIN — Medication 2 TABLET(S): at 21:10

## 2025-04-27 RX ADMIN — Medication 975 MILLIGRAM(S): at 18:12

## 2025-04-27 RX ADMIN — LISINOPRIL 20 MILLIGRAM(S): 5 TABLET ORAL at 15:45

## 2025-04-27 RX ADMIN — METHOCARBAMOL 500 MILLIGRAM(S): 500 TABLET, FILM COATED ORAL at 18:56

## 2025-04-27 RX ADMIN — Medication 975 MILLIGRAM(S): at 05:56

## 2025-04-27 NOTE — PROGRESS NOTE ADULT - ASSESSMENT
The patient is a 89 year old female with a past medical history of hypertension and hypothyroidism who was [place] [facility] [place] surgery evaluation of LLE injury. Patient states she was in her usual state of health when she was setting up her shower when she slipped and fell on her shower mat. She cut her LLE on the edge of the shower. Denies prodromal symptoms or syncope. Xrays of the ankle pelvis and chest were negative. Transferred to [facility] for plastic surgery evaluation for LLE de-gloving injury.  Status post debridement with split thickness autograft of the left thigh, Wound vac placed on 4/24      Assessment/Plan:    1. Mechanical fall resulting in LLE De gloving injury  - Status post debridement with split thickness autograft of the left thigh, Wound vac placed on 4/24    Plan:  - wound vac to be kept in place until 5/2  - L thigh donor site dressing to be kept in place until 5/2  - NWB to LLE  - WV in place to 125mmHg    2. Hyponatremia  -->133 - improving , continue IVF 75 ml x 24 hrs   f/u labs in am     3. Anemia  - Baseline Hb in 03/2025 of 10.5  - Hg dropped on 4/26 to 7 , s/p 1 U PRBC on 4/26 ,     todays Hg 9.0 - improved     - Suspected blood loss from injury?  - B12, folate and LDH WNL  - Low iron and Iron sat  - today's Hg 7 , BP on lower side, 1 U PRBC given       4. Hypertension-  this am   -  Quinapril/ HCTZ 10/12.5 PO Daily - held  due to renal failure and hypotension      Will restart home meds today with parameters   - Monitor BP closely  - May give Hydralazine ivp 10 mg Q 6 hrs for SBP > 160     5. Hypothyroidism  - TSH is 0.24; repeated TSH today 0.45 - nl  ,  - Continue Synthroid 100mcg PO Daily for now     6. Acute renal failure - likely due to anemia / hypovolemia , likely on CKD - 1U PRBC given ,   - resolved    Avoid NSAID's - consider to d/c Ibuprofen  May restart home BP meds     VTE_ SCDS     Geriatric Screening:    Functional Assessment: [ ] Independent [ x ] Assistance [ ] Total care [ ] Non-ambulatory    X[ ] Fall risks identified: OA of the knees     [ ] High risk medications identified: None     [X ] Delirium and other risks can be reduced by:    -early ambulation    -minimizing "tethers" - IV, oxygen, catheters, etc    -avoiding hypnotics and sedatives    -maintaining hydration/nutrition    -avoid anticholinergics - diphenhydramine, etc    -pain control    -supportive environment    Advanced Directives: To be addressed by primary team     Will sign off , please recall if needed .

## 2025-04-28 LAB
ANION GAP SERPL CALC-SCNC: 9 MMOL/L — SIGNIFICANT CHANGE UP (ref 5–17)
BUN SERPL-MCNC: 19 MG/DL — SIGNIFICANT CHANGE UP (ref 8–20)
CALCIUM SERPL-MCNC: 8.8 MG/DL — SIGNIFICANT CHANGE UP (ref 8.4–10.5)
CHLORIDE SERPL-SCNC: 95 MMOL/L — LOW (ref 96–108)
CHOLEST SERPL-MCNC: 186 MG/DL — SIGNIFICANT CHANGE UP
CK MB CFR SERPL CALC: 4.5 NG/ML — SIGNIFICANT CHANGE UP (ref 0–6.7)
CK SERPL-CCNC: 158 U/L — SIGNIFICANT CHANGE UP (ref 25–170)
CO2 SERPL-SCNC: 25 MMOL/L — SIGNIFICANT CHANGE UP (ref 22–29)
CREAT SERPL-MCNC: 1 MG/DL — SIGNIFICANT CHANGE UP (ref 0.5–1.3)
EGFR: 51 ML/MIN/1.73M2 — LOW
EGFR: 51 ML/MIN/1.73M2 — LOW
GLUCOSE SERPL-MCNC: 100 MG/DL — HIGH (ref 70–99)
HCT VFR BLD CALC: 27.8 % — LOW (ref 34.5–45)
HDLC SERPL-MCNC: 83 MG/DL — SIGNIFICANT CHANGE UP
HGB BLD-MCNC: 9.1 G/DL — LOW (ref 11.5–15.5)
LDLC SERPL-MCNC: 90 MG/DL — SIGNIFICANT CHANGE UP
LIPID PNL WITH DIRECT LDL SERPL: 90 MG/DL — SIGNIFICANT CHANGE UP
MAGNESIUM SERPL-MCNC: 1.8 MG/DL — SIGNIFICANT CHANGE UP (ref 1.6–2.6)
MCHC RBC-ENTMCNC: 26.9 PG — LOW (ref 27–34)
MCHC RBC-ENTMCNC: 32.7 G/DL — SIGNIFICANT CHANGE UP (ref 32–36)
MCV RBC AUTO: 82.2 FL — SIGNIFICANT CHANGE UP (ref 80–100)
NONHDLC SERPL-MCNC: 103 MG/DL — SIGNIFICANT CHANGE UP
NRBC # BLD AUTO: 0 K/UL — SIGNIFICANT CHANGE UP (ref 0–0)
NRBC # FLD: 0 K/UL — SIGNIFICANT CHANGE UP (ref 0–0)
NRBC BLD AUTO-RTO: 0 /100 WBCS — SIGNIFICANT CHANGE UP (ref 0–0)
OSMOLALITY SERPL: 280 MOSMOL/KG — SIGNIFICANT CHANGE UP (ref 280–301)
PHOSPHATE SERPL-MCNC: 2.4 MG/DL — SIGNIFICANT CHANGE UP (ref 2.4–4.7)
PLATELET # BLD AUTO: 319 K/UL — SIGNIFICANT CHANGE UP (ref 150–400)
PMV BLD: 9.5 FL — SIGNIFICANT CHANGE UP (ref 7–13)
POTASSIUM SERPL-MCNC: 4.5 MMOL/L — SIGNIFICANT CHANGE UP (ref 3.5–5.3)
POTASSIUM SERPL-SCNC: 4.5 MMOL/L — SIGNIFICANT CHANGE UP (ref 3.5–5.3)
RBC # BLD: 3.38 M/UL — LOW (ref 3.8–5.2)
RBC # FLD: 16.3 % — HIGH (ref 10.3–14.5)
SODIUM SERPL-SCNC: 129 MMOL/L — LOW (ref 135–145)
TRIGL SERPL-MCNC: 71 MG/DL — SIGNIFICANT CHANGE UP
WBC # BLD: 6.39 K/UL — SIGNIFICANT CHANGE UP (ref 3.8–10.5)
WBC # FLD AUTO: 6.39 K/UL — SIGNIFICANT CHANGE UP (ref 3.8–10.5)

## 2025-04-28 PROCEDURE — 93970 EXTREMITY STUDY: CPT | Mod: 26

## 2025-04-28 PROCEDURE — 99231 SBSQ HOSP IP/OBS SF/LOW 25: CPT

## 2025-04-28 RX ORDER — MAGNESIUM SULFATE 500 MG/ML
2 SYRINGE (ML) INJECTION ONCE
Refills: 0 | Status: COMPLETED | OUTPATIENT
Start: 2025-04-28 | End: 2025-04-28

## 2025-04-28 RX ORDER — SOD PHOS DI, MONO/K PHOS MONO 250 MG
1 TABLET ORAL ONCE
Refills: 0 | Status: COMPLETED | OUTPATIENT
Start: 2025-04-28 | End: 2025-04-28

## 2025-04-28 RX ADMIN — LISINOPRIL 20 MILLIGRAM(S): 5 TABLET ORAL at 05:21

## 2025-04-28 RX ADMIN — Medication 10 MILLIGRAM(S): at 08:42

## 2025-04-28 RX ADMIN — Medication 1 TABLET(S): at 12:17

## 2025-04-28 RX ADMIN — Medication 1 PACKET(S): at 08:31

## 2025-04-28 RX ADMIN — Medication 25 GRAM(S): at 08:31

## 2025-04-28 RX ADMIN — ENOXAPARIN SODIUM 30 MILLIGRAM(S): 100 INJECTION SUBCUTANEOUS at 21:52

## 2025-04-28 RX ADMIN — Medication 975 MILLIGRAM(S): at 16:58

## 2025-04-28 RX ADMIN — Medication 600 MILLIGRAM(S): at 00:07

## 2025-04-28 RX ADMIN — Medication 975 MILLIGRAM(S): at 12:17

## 2025-04-28 RX ADMIN — POLYETHYLENE GLYCOL 3350 17 GRAM(S): 17 POWDER, FOR SOLUTION ORAL at 12:16

## 2025-04-28 RX ADMIN — Medication 975 MILLIGRAM(S): at 19:05

## 2025-04-28 RX ADMIN — Medication 100 MICROGRAM(S): at 05:21

## 2025-04-28 NOTE — SWALLOW BEDSIDE ASSESSMENT ADULT - COMMENTS
The patient is a 89 year old female with a past medical history of hypertension and hypothyroidism who was transferred from [facility] [place] for plastic surgery evaluation of LLE injury. Patient states she was in her usual state of health when she was setting up her shower when she slipped and fell on her shower mat. She cut her LLE on the edge of the shower. Denies prodromal symptoms or syncope. Xrays of the ankle pelvis and chest were negative. Transferred to [facility] for plastic surgery evaluation for LLE de-gloving injury.  Status post debridement with split thickness autograft of the left thigh, Wound vac placed on 4/24

## 2025-04-28 NOTE — SWALLOW BEDSIDE ASSESSMENT ADULT - SWALLOW EVAL: DIAGNOSIS
Oral and pharyngeal stages of swallow appear grossly functional for administered consistencies. No overt s/s aspiration noted at bedside. Given advanced age, pt with likely presbyphagic component to swallow mechanism. Adherence to aspiration precautions recommended to minimize risk for aspiration and overall swallow difficulty.

## 2025-04-28 NOTE — PROGRESS NOTE ADULT - ATTENDING COMMENTS
Patient seen and examined at bedside. No acute events overnight. Denies any nausea or vomiting. Pain is well controlled. Afebrile. Tolerating diet    Continue diet as tolerated  dressing managed by Plastics  F/U LLE Duplex  SCDs and LVNX for DVT ppx  wound vac kept in place until 5/2

## 2025-04-28 NOTE — SWALLOW BEDSIDE ASSESSMENT ADULT - SLP PERTINENT HISTORY OF CURRENT PROBLEM
Pt denies swallow difficulty, reports eating a regular/thin liquid diet at baseline. pt does note that eating in the bed with suboptimal positioning can have a negative impact on swallow function,  particularly for large pills, but added that "she's just going slow and the nurses are crushing the big ones in applesauce"

## 2025-04-28 NOTE — SWALLOW BEDSIDE ASSESSMENT ADULT - SLP GENERAL OBSERVATIONS
Pt recd A&A, 0x3 in bed, assisted by clinician to optimize upright positioning, tolerating room air, pain 0/10 pre/post

## 2025-04-28 NOTE — SWALLOW BEDSIDE ASSESSMENT ADULT - SWALLOW EVAL: PATIENT/FAMILY GOALS STATEMENT
"Date of Office Visit: 22  Encounter Provider: Camron Virk MD  Place of Service: [facility] CARDIOLOGY  Patient Name: Radha Astorga  :1978    Chief Complaint   Patient presents with   • CARDIO ONCOLOGY    :     HPI:     Ms. Astorga is 44 y.o. and presents today to follow up. I have reviewed prior notes and there are no changes except for any new updates described below. I have also reviewed any information entered into the medical record by the patient or by ancillary staff.     She was followed by cardiology as a child for a history of rheumatic fever and mild mitral regurgitation. She has a history of hypertension and is on valsartan.    An echo in our office in 2021 reported \"mild mitral prolapse, trace-mild mitral regurgitation, and mild mitral stenosis.\"  I reviewed the study and disagreed. Her mitral valve was mildly thickened and had a mildly rheumatic appearance, but there was no MVP or MS (inflow gradient 4mm Hg), and there was trace MR    She was diagnosed with breast cancer (ER/OH negative, HER2 +++) in . She underwent lumpectomy/sentinel node biopsy in 2021 It was stage 1. She was treated with paclitaxel and is nearing one year of trastuzumab therapy. She has had serial echocardiograms that have been normal (EF 60%, GSL ~-20%).    She feels well and has no cardiac symptoms.     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   • History of rheumatic fever    • Hypertension    • Hypothyroidism    • " Infiltrating ductal carcinoma of left breast (HCC) 2021    Left   • Kidney stone    • Mitral valve insufficiency    • PONV (postoperative nausea and vomiting)    • Stroke (HCC) 2020    HX OF   • Stroke-like symptoms        Past Surgical History:   Procedure Laterality Date   • BREAST BIOPSY Left 2021    IDC   • BREAST LUMPECTOMY     • BREAST LUMPECTOMY WITH SENTINEL NODE BIOPSY N/A 2021    Procedure: right port placement, Left SHAINA-guided, bracketed, partial mastectomy and sentinel lymph node biopsy Left breast needle-localized excisional biopsy;  Surgeon: Lashonda Euceda MD;  Location: [facility];  Service: General;  Laterality: N/A;   • BREAST SURGERY Bilateral 2021    Procedure: RIGHT BREAST REDUCTION MASTOPEXY LEFT BREAST ONCOPLASTIC CLOSURE;  Surgeon: Caridad Baker MD PhD;  Location: [facility];  Service: Plastics;  Laterality: Bilateral;   • NO PAST SURGERIES     • PAP SMEAR         Social History     Socioeconomic History   • Marital status: Significant Other   • Number of children: 0   Tobacco Use   • Smoking status: Former Smoker     Packs/day: 1.00     Years: 10.00     Pack years: 10.00     Types: Cigarettes     Start date:      Quit date:      Years since quittin.3   • Smokeless tobacco: Never Used   Vaping Use   • Vaping Use: Never used   Substance and Sexual Activity   • Alcohol use: Yes     Comment: RARELY   • Drug use: No   • Sexual activity: Defer       Family History   Problem Relation Age of Onset   • Breast cancer Mother 53   • Pancreatic cancer Mother 68   • Lung cancer Maternal Grandmother    • Stroke Father    • Breast cancer Paternal Aunt 55   • Prostate cancer Maternal Grandfather    • Prostate cancer Paternal Grandfather    • Brain cancer Maternal Cousin 20   • Melanoma Maternal Uncle    • Ovarian cancer Other         Paternal great aunt    • Breast cancer Other    • Breast cancer Paternal Great-Grandmother 94   • Hypertension  "Brother    • Malraymond Hyperthermia Neg Hx        Review of Systems   All other systems reviewed and are negative.      Allergies   Allergen Reactions   • Sulfa Antibiotics Rash         Current Outpatient Medications:   •  atomoxetine (Strattera) 40 MG capsule, Take 1 capsule by mouth Daily., Disp: 30 capsule, Rfl: 0  •  atorvastatin (Lipitor) 10 MG tablet, Take 1 tablet by mouth Daily., Disp: 30 tablet, Rfl: 11  •  famotidine (PEPCID) 20 MG tablet, TAKE 1 TABLET BY MOUTH DAILY, Disp: 30 tablet, Rfl: 3  •  levothyroxine (Synthroid) 125 MCG tablet, Take 1 tablet by mouth Daily., Disp: 90 tablet, Rfl: 1  •  lidocaine-prilocaine (EMLA) 2.5-2.5 % cream, Apply  topically to the appropriate area as directed As Needed for Mild Pain ., Disp: 1 each, Rfl: 2  •  LORazepam (ATIVAN) 0.5 MG tablet, Take 1 tablet by mouth Every 8 (Eight) Hours As Needed for Anxiety. for anxiety, Disp: 30 tablet, Rfl: 2  •  ondansetron ODT (ZOFRAN-ODT) 8 MG disintegrating tablet, Place 1 tablet on the tongue Every 8 (Eight) Hours As Needed for Nausea or Vomiting., Disp: 30 tablet, Rfl: 3  •  traZODone (DESYREL) 50 MG tablet, Take 1 tablet by mouth Every Night., Disp: 30 tablet, Rfl: 11  •  valsartan (DIOVAN) 160 MG tablet, Take 1 tablet by mouth Daily., Disp: 30 tablet, Rfl: 5  •  vitamin B-12 (CYANOCOBALAMIN) 1000 MCG tablet, Take 1 tablet by mouth Daily., Disp: 30 tablet, Rfl: 2  •  Xarelto 20 MG tablet, TAKE 1 TABLET BY MOUTH DAILY, Disp: 30 tablet, Rfl: 3  No current facility-administered medications for this visit.    Facility-Administered Medications Ordered in Other Visits:   •  heparin injection 500 Units, 500 Units, Intravenous, PRNNimesh Leela, MD, 500 Units at 08/11/21 1718  •  sodium chloride 0.9 % flush 10 mL, 10 mL, Intravenous, PRNNimesh Leela, MD, 10 mL at 08/11/21 1718      Objective:     Vitals:    05/11/22 1038   BP: 118/80   Pulse: 78   Weight: 70.2 kg (154 lb 12.8 oz)   Height: 157.5 cm (62\")     Body mass index is 28.31 " kg/m².    Vitals reviewed.   Constitutional:       Appearance: Healthy appearance. Well-developed and not in distress.   Eyes:      Conjunctiva/sclera: Conjunctivae normal.   HENT:      Head: Normocephalic.      Nose: Nose normal.         Comments: Masked  Neck:      Vascular: No JVD. JVD normal.      Lymphadenopathy: No cervical adenopathy.   Pulmonary:      Effort: Pulmonary effort is normal.      Breath sounds: Normal breath sounds.   Cardiovascular:      Normal rate. Regular rhythm.      Murmurs: There is no murmur.   Pulses:     Intact distal pulses.   Edema:     Peripheral edema absent.   Abdominal:      Palpations: Abdomen is soft.      Tenderness: There is no abdominal tenderness.   Musculoskeletal: Normal range of motion.      Cervical back: Normal range of motion. Skin:     General: Skin is warm and dry.      Findings: No rash.   Neurological:      General: No focal deficit present.      Mental Status: Alert, oriented to person, place, and time and oriented to person, place and time.      Cranial Nerves: No cranial nerve deficit.   Psychiatric:         Behavior: Behavior normal.         Thought Content: Thought content normal.         Judgment: Judgment normal.           ECG 12 Lead    Date/Time: 5/11/2022 11:13 AM  Performed by: Camron Virk MD  Authorized by: Camron Virk MD   Comparison: compared with previous ECG   Similar to previous ECG  Rhythm: sinus rhythm  Conduction: conduction normal  ST Segments: ST segments normal  T Waves: T waves normal  QRS axis: normal  Other: no other findings    Clinical impression: normal ECG            Assessment:       Diagnosis Plan   1. Malignant neoplasm of overlapping sites of left breast in female, estrogen receptor negative (HCC)     2. Chemotherapy follow-up examination  Adult Transthoracic Echo Complete W/ Cont if Necessary Per Protocol   3. Essential hypertension     4. 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 She is completing one year of trastuzumab therapy in June. She has had stable echocardiograms every 3 months; one was repeated today and I'll get another in August. If that's stable, we can then just resume serial follow-up for her stable MV disease .    Her BP is perfect.     Sincerely,       Camron Virk MD                 "I swallow fine..I just am not in the best position being in this bed where my behind sinks down and I can't always get myself up straight"

## 2025-04-29 LAB
ANION GAP SERPL CALC-SCNC: 12 MMOL/L — SIGNIFICANT CHANGE UP (ref 5–17)
BASOPHILS # BLD AUTO: 0.02 K/UL — SIGNIFICANT CHANGE UP (ref 0–0.2)
BASOPHILS NFR BLD AUTO: 0.3 % — SIGNIFICANT CHANGE UP (ref 0–2)
BUN SERPL-MCNC: 13.1 MG/DL — SIGNIFICANT CHANGE UP (ref 8–20)
CALCIUM SERPL-MCNC: 8.9 MG/DL — SIGNIFICANT CHANGE UP (ref 8.4–10.5)
CHLORIDE SERPL-SCNC: 97 MMOL/L — SIGNIFICANT CHANGE UP (ref 96–108)
CHLORIDE UR-SCNC: 56 MMOL/L — SIGNIFICANT CHANGE UP
CO2 SERPL-SCNC: 24 MMOL/L — SIGNIFICANT CHANGE UP (ref 22–29)
CREAT SERPL-MCNC: 0.93 MG/DL — SIGNIFICANT CHANGE UP (ref 0.5–1.3)
EGFR: 56 ML/MIN/1.73M2 — LOW
EGFR: 56 ML/MIN/1.73M2 — LOW
EOSINOPHIL # BLD AUTO: 0.22 K/UL — SIGNIFICANT CHANGE UP (ref 0–0.5)
EOSINOPHIL NFR BLD AUTO: 3.6 % — SIGNIFICANT CHANGE UP (ref 0–6)
GLUCOSE SERPL-MCNC: 95 MG/DL — SIGNIFICANT CHANGE UP (ref 70–99)
HCT VFR BLD CALC: 28 % — LOW (ref 34.5–45)
HGB BLD-MCNC: 9 G/DL — LOW (ref 11.5–15.5)
IMM GRANULOCYTES # BLD AUTO: 0.02 K/UL — SIGNIFICANT CHANGE UP (ref 0–0.07)
IMM GRANULOCYTES NFR BLD AUTO: 0.3 % — SIGNIFICANT CHANGE UP (ref 0–0.9)
LYMPHOCYTES # BLD AUTO: 0.93 K/UL — LOW (ref 1–3.3)
LYMPHOCYTES NFR BLD AUTO: 15.3 % — SIGNIFICANT CHANGE UP (ref 13–44)
MAGNESIUM SERPL-MCNC: 1.8 MG/DL — SIGNIFICANT CHANGE UP (ref 1.6–2.6)
MCHC RBC-ENTMCNC: 26.7 PG — LOW (ref 27–34)
MCHC RBC-ENTMCNC: 32.1 G/DL — SIGNIFICANT CHANGE UP (ref 32–36)
MCV RBC AUTO: 83.1 FL — SIGNIFICANT CHANGE UP (ref 80–100)
MONOCYTES # BLD AUTO: 0.97 K/UL — HIGH (ref 0–0.9)
MONOCYTES NFR BLD AUTO: 15.9 % — HIGH (ref 2–14)
NEUTROPHILS # BLD AUTO: 3.93 K/UL — SIGNIFICANT CHANGE UP (ref 1.8–7.4)
NEUTROPHILS NFR BLD AUTO: 64.6 % — SIGNIFICANT CHANGE UP (ref 43–77)
NRBC # BLD AUTO: 0 K/UL — SIGNIFICANT CHANGE UP (ref 0–0)
NRBC # FLD: 0 K/UL — SIGNIFICANT CHANGE UP (ref 0–0)
NRBC BLD AUTO-RTO: 0 /100 WBCS — SIGNIFICANT CHANGE UP (ref 0–0)
OSMOLALITY SERPL: 281 MOSMOL/KG — SIGNIFICANT CHANGE UP (ref 280–301)
OSMOLALITY UR: 221 MOSM/KG — LOW (ref 300–1000)
PHOSPHATE SERPL-MCNC: 2.9 MG/DL — SIGNIFICANT CHANGE UP (ref 2.4–4.7)
PLATELET # BLD AUTO: 262 K/UL — SIGNIFICANT CHANGE UP (ref 150–400)
PMV BLD: 10.4 FL — SIGNIFICANT CHANGE UP (ref 7–13)
POTASSIUM SERPL-MCNC: 4.4 MMOL/L — SIGNIFICANT CHANGE UP (ref 3.5–5.3)
POTASSIUM SERPL-SCNC: 4.4 MMOL/L — SIGNIFICANT CHANGE UP (ref 3.5–5.3)
RBC # BLD: 3.37 M/UL — LOW (ref 3.8–5.2)
RBC # FLD: 16.6 % — HIGH (ref 10.3–14.5)
SODIUM SERPL-SCNC: 132 MMOL/L — LOW (ref 135–145)
SODIUM UR-SCNC: 62 MMOL/L — SIGNIFICANT CHANGE UP
WBC # BLD: 6.09 K/UL — SIGNIFICANT CHANGE UP (ref 3.8–10.5)
WBC # FLD AUTO: 6.09 K/UL — SIGNIFICANT CHANGE UP (ref 3.8–10.5)

## 2025-04-29 PROCEDURE — 99231 SBSQ HOSP IP/OBS SF/LOW 25: CPT

## 2025-04-29 RX ADMIN — ENOXAPARIN SODIUM 30 MILLIGRAM(S): 100 INJECTION SUBCUTANEOUS at 21:47

## 2025-04-29 RX ADMIN — LISINOPRIL 20 MILLIGRAM(S): 5 TABLET ORAL at 05:05

## 2025-04-29 RX ADMIN — Medication 1 TABLET(S): at 12:15

## 2025-04-29 RX ADMIN — Medication 5 MILLIGRAM(S): at 21:47

## 2025-04-29 RX ADMIN — Medication 75 MILLILITER(S): at 21:47

## 2025-04-29 RX ADMIN — Medication 100 MICROGRAM(S): at 05:05

## 2025-04-29 RX ADMIN — Medication 75 MILLILITER(S): at 17:50

## 2025-04-29 NOTE — OCCUPATIONAL THERAPY INITIAL EVALUATION ADULT - MANUAL MUSCLE TESTING RESULTS, REHAB EVAL
Virtual Visit Details    Type of service:  Video Visit     Originating Location (pt. Location): Home    Distant Location (provider location):  On-site  Platform used for Video Visit: [facility]    Palliative Care Outpatient Clinic Consultation Note    Patient:  Gallo Navarro    Chief Complaint:   Gallo Navarro 55 year old male who is presenting to the palliative medicine clinic today at the request of Dr. Lucas Ray for a palliative care consultation secondary to pancreatic cancer.   The patient's primary care provider is:  Duc Jaramillo.     History of Present Illness:  55 year old diagnosed this month with locally advanced, unresectable pancreas adenoca who is scheduled to begin FOLFIRINOX on Monday.  He has had a 40# weight loss and feels like he has lost some strength.  He is very used to keeping very busy and active. He has a venting G tube and a GJ tube for nutrition and he is doing some eating orally.    Distressing Symptom/s: some troubles with eating due to gastroduodenal stent. Mainly a full liquid diet and smoothies; he is aiming to eat 3 protein smoothies a day;     Patient's Disease Understanding: good; he feels he is getting good communication from oncology team    Coping: overall well, Gallo feels prepared for this 'fight'    Social History  Born: [place], [place] and moved to [facility] in 2000  Education: [employer] then [employer]; has PRANAV   Living Situation: single family home with wife and daughter and son who is doing on line classes;   Relationships:  to Abigail for almost 31 years; Abigail is  at [employer]  Children: 13 yo daughter Rosemarie  in high school and she works at [employer]; 21 yo son at [employer] working on a [employer] (ABAM) engineering degree-he was born deaf and has a cochlear implant;   Actual/Potential Caregiver(s): wife Abigail  Support System: big [employer] group of high school friends; older sister is former ICU nurse and brother who is  missing a 13th chromosome; good neighbors  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 and he worked for [employer] for 20+ years (he is employee # [id number])  Patient is 'famous for being a dad and brother and uncle--his love and support for his extended family and his reputation as a very helpful and accessible teacher.'  Substance Use/History of misuse: minimal alcohol use for past 10+ years  Financial Concerns: none  Spiritual Background: Moravian--goes to [facility] at th s time  Spiritual Concerns/Needs: he is well connected to his     Social History     Tobacco Use    Smoking status: Never    Smokeless tobacco: Never   Vaping Use    Vaping Use: Never used   Substance Use Topics    Alcohol use: Not Currently     Comment: o/w light beer 2days a week    Drug use: Never       Family History  Family History   Problem Relation Age of Onset    Diabetes Type 2  Father     Cirrhosis Father     Genetic Disorder Brother         missing [id number], mild retardation    Colon Polyps Brother     Colon Cancer Paternal Uncle     Pancreatic Cancer Paternal Aunt     Pancreatitis Cousin        Advance Care Planning:  Advance Directive:    none completed, link sent to documents today  Where is written copy located: n/a  Health Care Agent Contact Information: wife Abigail ROBLEDO:   n/a  CODE STATUS: FULL    No Known Allergies  Current Outpatient Medications   Medication Sig Dispense Refill    acetaminophen (TYLENOL) 32 mg/mL liquid Take 10.156 mLs (325 mg) by mouth every 4 hours as needed for fever or mild pain 473 mL 11    B-D U/F 31G X 8 MM insulin pen needle       blood glucose (FREESTYLE TEST STRIPS) test strip by Other route as needed      Continuous Blood Gluc Sensor (FREESTYLE KAREN 2 SENSOR) MISC       HYDROmorphone (DILAUDID) 2 MG tablet Take 1 tablet (2 mg) by mouth every 6 hours as needed for severe pain 30 tablet 0    hydrOXYzine (VISTARIL) 25 MG capsule Take 25 mg by mouth       insulin aspart (NOVOLOG PEN) 100 UNIT/ML pen Inject 1-10 Units Subcutaneous 3 times daily (with meals) 15 mL 3    insulin glargine (BASAGLAR KWIKPEN) 100 UNIT/ML pen Inject 20 Units Subcutaneous every morning for 360 days 18 mL 3    insulin  UNIT/ML injection Inject 15 Units Subcutaneous every evening for 60 days 9 mL 0    Lancets (ONETOUCH DELICA PLUS [id number]) MISC       levofloxacin (LEVAQUIN) 25 MG/ML solution 20 mLs (500 mg) by Per J Tube route daily 40 mL 0    lipase-protease-amylase (CREON 24) 65286-82690-988664 units CPEP per EC capsule 1-2 capsules by Per J Tube route 3 times daily (with meals) 180 capsule 0    pantoprazole (PROTONIX) 40 MG EC tablet Take 40 mg by mouth daily      polyethylene glycol (MIRALAX) 17 GM/Dose powder Take 17 g by mouth daily 510 g 3    sennosides (SENOKOT) 8.8 MG/5ML syrup 5 mLs by Per J Tube route 2 times daily 236 mL 3    sodium bicarbonate 325 MG tablet 1 tablet (325 mg) by Per J Tube route 3 times daily 90 tablet 0    vitamin D3 (CHOLECALCIFEROL) 50 mcg (2000 units) tablet Take 1 tablet (50 mcg) by mouth daily 30 tablet 1     Past Medical History:   Diagnosis Date    Acute pancreatitis 04/16/2023    Gastric outlet obstruction     Recurrent acute pancreatitis      Past Surgical History:   Procedure Laterality Date    AS OPEN TREATMENT CLAVICULAR FRACTURE INTERNAL FX      ENDOSCOPIC RETROGRADE CHOLANGIOPANCREATOGRAM N/A 7/11/2023    Procedure: ENDOSCOPIC RETROGRADE CHOLANGIOPANCREATOGRAPHY;  Surgeon: Khadar Pickett MD;  Location: [facility] OR    ENDOSCOPIC ULTRASOUND UPPER GASTROINTESTINAL TRACT (GI) N/A 7/11/2023    Procedure: Endoscopic ultrasound upper gastrointestinal tract (GI), with biposy, GJ tube repositioning, stent placement;  Surgeon: Khadar Pickett MD;  Location: [facility] OR    ENDOSCOPIC ULTRASOUND UPPER GASTROINTESTINAL TRACT (GI) N/A 7/13/2023    Procedure: ENDOSCOPIC ULTRASOUND, ESOPHAGOSCOPY, EUS guided gastrojejunostomy;  Surgeon: Tre York MD;   Location: [facility] OR    INSERT PICC LINE  2023    IR NG TUBE PLACEMENT REQ RAD & FLUORO  2023    JG tube       REVIEW OF SYSTEMS:   ROS: 10 point ROS neg other than the symptoms noted above in the HPI and here:  Palliative Symptom Review (0=no symptom/no concern, 1=mild, 2=moderate, 3=severe):      Pain: dilaudid only lasts 4 hours (it's written for q 6 hours) using about 10 mg total/day; doesn't make him drowsy or constipated; tylenol césar 1800 mg total in a day      Fatigue: 0-1      Nausea: 0      Constipation: 0 uses senna and Miralax      Diarrhea: 0      Depressive Symptoms: 0      Anxiety: 0      Drowsiness: 0      Poor Appetite: 0-1      Shortness of Breath: 0      Insomnia: 0-1      Bloating in stomach area:  0-11      Overall (0 good/no concerns, 3 very poor):  1    GENERAL APPEARANCE: healthy, alert and no distress; neatly groomed  EYES: Eyes grossly normal to inspection, PERRLA, conjunctivae and sclerae without injection or discharge, EOM intact   RESP:  no increased work of breathing; speaks in complete sentences;   MS: No musculoskeletal defects are noted  SKIN: No suspicious lesions or rashes, hydration status appears adequate with normal skin turgor   PSYCH: Alert and oriented x3; speech- coherent , normal rate and volume; able to articulate logical thoughts, able to abstract reason, no tangential thoughts, no hallucinations or delusions, mentation appears normal, Mood is euthymic. Affect is appropriate for this mood state and bright. Thought content is free of suicidal ideation, hallucinations, and delusions.  Eye contact is good during conversation.       Data Reviewed:  LABS: 2023 Cr 0.66; albumin 3.8; Tbili 1.9; Hgb 10.4;   IMAGIN2023 CT CAP W/CONTRAST  IMPRESSION:   1.  Pancreas mass without pancreatic ductal dilatation.  2.  Biliary stent in good position with mild pneumobilia.  3.  Question a calcific density adjacent to the distal biliary stent, it is unclear if this is  a pancreatic calcification in the adjacent parenchyma versus a calcified gallstone.      2023 CT CHEST W/CONTRAST  mpression:   1. A 7 mm pulmonary nodule in the left upper lobe which has not been  imaged previously is indeterminate. Recommend 3-6 month follow-up.  2. Partially visualized ill-defined pancreatic head mass with upper  abdominal lymphadenopathy. Biliary drains and percutaneous  gastrojejunostomy tube.  3. No acute pathology within the chest.       Impressions:  ECO  Decision Making Capacity:  VERY PRESENT  PDMP review:  yes, no concerns    Locally extensive non-resectable pancreatic cancer  Cancer associated pain    GOALS OF CARE:  Life prolonging without limits at this time.    Recommendations & Counseling:  START buprenorphine 10 mcg/hour patch and replace weekly  Continue dilaudid 1-2 mg by j tube q 4 hours prn  Tylenol suspension 650 mg by J tube TID  Narcan also prescribed for safety  Follow up by video in 4 weeks, sooner prn and RNCC will check in on how [facility] is working next week.    Link provided in AVS to [facility] documents.    Counseling: All of the above was explained to the patient in lay language. The patient has verbalized a clear understanding of the discussion, asked appropriate questions, which have been answered to patient's apparent satisfaction. The patient is in agreement with the above plan.    77 minutes spent on the date of the encounter doing chart review, history and exam, patient education & counseling, documentation and other activities as noted above.    Jeremy Hurt MD MS FAAFP CAQHPM  [facility]  Office [phone]  Fax [phone]         bilat UE grossly 4-/5

## 2025-04-29 NOTE — PROGRESS NOTE ADULT - ATTENDING COMMENTS
Patient seen and examined at bedside. No acute events overnight. Denies any nausea or vomiting. Pain is well controlled. Afebrile. Tolerating diet    Continue diet as tolerated  dressing managed by Plastics  SCDs and LVNX for DVT ppx  wound vac kept in place until 5/2 . Patient seen and examined at bedside. No acute events overnight. Denies any nausea or vomiting. Pain is well controlled. Afebrile. Tolerating diet    Continue diet as tolerated  dressing managed by Plastics  SCDs and LVNX for DVT ppx  wound vac kept in place until 5/2

## 2025-04-29 NOTE — OCCUPATIONAL THERAPY INITIAL EVALUATION ADULT - ADDITIONAL COMMENTS
Pt has shower stall with doors and built in chair  Pt has a comfort height toilet  Pt owns a rollator  Pt is right handed

## 2025-04-29 NOTE — CHART NOTE - NSCHARTNOTEFT_GEN_A_CORE
Tertiary Trauma Survey (TTS)    Date of TTS: 04-29-25 @ 17:33                             Admit Date: 04-23-25 @ 19:25      Trauma Activation:    List Injuries Identified to Date:  1. LLE skin Avulsion    List Operative and Interventional Radiological Procedures:   1. Debridement and Irrigation, SPSG with Wound Vac placement    - PTSD  []  Date:_N/A    - SBIRT []  Date: N/A    - Geriatric consult []  Date: 4/24    - GOC []  Date: not done      Physical Exam    Neuro: no gross focal deficits    HEENT: NC/AT    Pulm/Chest: no accessory muscle use or conversational dyspnea, no chest wall tenderness    Cardiac:  RRR    GI / Abdomen:  soft, NT/ND    Musculoskeletal / Extremities: COMBS with FROM and no pain, no tenderness        RADIOLOGICAL FINDINGS REVIEW:      INCIDENTAL FINDINGS:    [x] No    [] Yes, Findings are:        [x] Tertiary exam complete, there are no new injuries identified    [] Tertiary exam done, new injuries identified are:                [] Imaging ordered:

## 2025-04-29 NOTE — PROGRESS NOTE ADULT - ASSESSMENT
89 y/o female POD4 s/p LLE wound debridement and split thickness autograft placement and WV after a fall with skin avulsion to LLE on 4/23.     Plan:  - f/u read on LE duplex scans to r/o DVT   - monitor for hemorrhage from donor site and wound   - wound vac kept in place until 5/2   - left thigh donor site dressing kept in place until 5/2  - NWB to LLE  - keep LLE elevated   - Notify Dr. Ross if any issues with WV/donor site   - regular diet   - pain control PRN   - IS   - encourage OOB/chair   - PT: MILAN  - f/u OT   - DVT ppx: lovenox, SCDs  - Dispo: MILAN  89 y/o female POD4 s/p LLE wound debridement and split thickness autograft placement and WV after a fall with skin avulsion to LLE on 4/23.     Plan:  - f/u read on LE duplex scans to r/o DVT   - monitor for hemorrhage from donor site and wound   - wound vac for change tentatively on 04/30   - left thigh donor site dressing kept in place until 5/2  - NWB to LLE  - keep LLE elevated   - Notify Dr. Ross if any issues with WV/donor site   - regular diet   - pain control PRN   - IS   - encourage OOB/chair   - PT: MILAN, dispo planning for this week  - f/u OT   - DVT ppx: lovenox, SCDs  - Dispo: MILAN

## 2025-04-29 NOTE — OCCUPATIONAL THERAPY INITIAL EVALUATION ADULT - LEVEL OF INDEPENDENCE: BED TO CHAIR, REHAB EVAL
squat pivot transfer with armrest down/minimum assist (75% patients effort)/moderate assist (50% patients effort)

## 2025-04-29 NOTE — OCCUPATIONAL THERAPY INITIAL EVALUATION ADULT - PERTINENT HX OF CURRENT PROBLEM, REHAB EVAL
[place] [facility] [place]p slip and fall in shower, cutting her leg

## 2025-04-29 NOTE — OCCUPATIONAL THERAPY INITIAL EVALUATION ADULT - DIAGNOSIS, OT EVAL
Left LE degloving injury; s/p debridement of wound using concurrent irrigation and suction device; left thigh split thickness graft

## 2025-04-30 ENCOUNTER — TRANSCRIPTION ENCOUNTER (OUTPATIENT)
Age: 89
End: 2025-04-30

## 2025-04-30 PROCEDURE — 99231 SBSQ HOSP IP/OBS SF/LOW 25: CPT

## 2025-04-30 RX ADMIN — Medication 1 TABLET(S): at 12:10

## 2025-04-30 RX ADMIN — ENOXAPARIN SODIUM 30 MILLIGRAM(S): 100 INJECTION SUBCUTANEOUS at 21:04

## 2025-04-30 RX ADMIN — Medication 5 MILLIGRAM(S): at 21:04

## 2025-04-30 RX ADMIN — Medication 100 MICROGRAM(S): at 06:01

## 2025-04-30 NOTE — CHART NOTE - NSCHARTNOTEFT_GEN_A_CORE
Wound VAC was taken off today 4/30. Next dressing change 5/2. Donor site with a xeroform in place secured by sutures. Please, do not remove.     Wound care:   Please leave the left lateral thigh donor site open to air. Keep clean and dry, Ok to pat dry with gauze if oozing.    Left lateral calf graft site: please, change dressing every other day for the next two weeks until the office follow-up with Dr. Ross.   Dressing: single layer of Xeroform in the shape of the wound, ABD pad x2, ACE wrap from the foot to the knee.     Weight bearing as tolerated to LLE. Avoid trauma to LLE. PT to re-assess     Please, call Dr. Ross's office to schedule a follow up appointment in 2 weeks [phone]

## 2025-04-30 NOTE — PROGRESS NOTE ADULT - ATTENDING COMMENTS
Patient seen and examined at bedside. No acute events overnight. Denies any nausea or vomiting. Pain is well controlled. Afebrile. Tolerating diet    Continue diet as tolerated  dressing managed by Plastics  SCDs and LVNX for DVT ppx  Wound vac removed by plastics today  PT eval today  Dispo planning

## 2025-04-30 NOTE — DIETITIAN INITIAL EVALUATION ADULT - ORAL INTAKE PTA/DIET HISTORY
Pt visited. Reports good appetite and tolerance to diet in-house. Pt endorses difficulty chewing large pills but is able to tolerate if crushed in applesauce. Swallow evaluation conducted 4/28, recommended to continue regular diet consistency. Advised patient to remain upright after eating or swallowing pills as able. Pt denies nausea or GI distress at this time. Nutrition focused physical exam conducted, mild age-related depletions noted, however no malnutrition dx at this time.  Pt visited. Reports good appetite and tolerance to diet in-house. Pt endorses difficulty chewing large pills but is able to tolerate if crushed in applesauce. Swallow evaluation conducted 4/28, recommended to continue regular diet consistency. Advised patient to remain upright after eating or swallowing pills as able. Pt denies nausea or GI distress at this time. Nutrition focused physical exam conducted, no depletions noted at this time.

## 2025-04-30 NOTE — DIETITIAN INITIAL EVALUATION ADULT - PERTINENT MEDS FT
MEDICATIONS  (STANDING):  hydrochlorothiazide 12.5 milliGRAM(s) Oral daily  multivitamin/minerals 1 Tablet(s) Oral daily  polyethylene glycol 3350 17 Gram(s) Oral daily  senna 2 Tablet(s) Oral at bedtime      MEDICATIONS  (PRN):  HYDROmorphone  Injectable 0.5 milliGRAM(s) IV Push every 3 hours PRN Severe Pain (7 - 10)

## 2025-04-30 NOTE — DIETITIAN INITIAL EVALUATION ADULT - PERTINENT LABORATORY DATA
04-29    132[L]  |  97  |  13.1  ----------------------------<  95  4.4   |  24.0  |  0.93    Iron 24   Iron saturation 6%

## 2025-04-30 NOTE — DIETITIAN INITIAL EVALUATION ADULT - OTHER INFO
89 year old female with a past medical history of hypertension and hypothyroidism [place] [facility] [place] LLE [place]. Patient states she was in her usual state of health when she was setting up her shower when she slipped and fell on her shower mat. She cut her LLE on the edge of the shower. Transferred to [facility] for plastic surgery evaluation for LLE de-gloving injury.  Status post debridement with split thickness autograft of the left thigh, Wound vac placed on 4/24

## 2025-04-30 NOTE — PROGRESS NOTE ADULT - ASSESSMENT
89 y/o female s/p LLE wound debridement and split thickness autograft placement and WV on 4/24 after a fall with skin avulsion to LLE on 4/23.     Plan:  - monitor for hemorrhage from donor site and wound   - wound vac for removal on 04/30 w/ Dr Ross  - left thigh donor site dressing kept in place until 4/30  - NWB to LLE  - keep LLE elevated   - Notify Dr. Ross if any issues with WV/donor site   - regular diet   - pain control PRN   - IS   - encourage OOB/chair   - PT and OT: MILAN, dispo planning for this week  - DVT ppx: lovenox, SCDs  - Dispo: MILAN

## 2025-04-30 NOTE — DISCHARGE NOTE PROVIDER - CARE PROVIDER_API CALL
Adithya Valdes  Surgery  [address] [place] [address], [id number]  [place] [address]  Phone: [phone]  Fax: [phone]  Follow Up Time: 2 weeks

## 2025-04-30 NOTE — DISCHARGE NOTE PROVIDER - HOSPITAL COURSE
Pt was [place] [facility] [place] [facility] [place] in the shower. Pt was found to have a degloving injury to her LLE and underwent a debridement of LLE wound using concurrent irrigation and suction device with a split-thickness autograft of left thigh on 4/24. Wound VAC was placed in the OR and Pt was NWB to E post-op. Pt tolerated operation well, had her wound VAC removed on 4/30. Wound care instructions were updated, PT re-evaluation was performed and Pt was recommended for discharge to *** with wound care every other day until follow up with Dr. sal in the office in two weeks.     Pt is tolerating diet, having bowel function, voiding freely, ambulating with assistance and is deemed safe for discharge to *** on ***. Pt was transferred to [facility] from [facility] after a slip and fall in the shower. Pt was found to have a degloving injury to her LLE and underwent a debridement of LLE wound using concurrent irrigation and suction device with a split-thickness autograft of left thigh on 4/24. Wound VAC was placed in the OR and Pt was NWB to [facility] post-op. Pt tolerated operation well, had her wound VAC removed on 4/30. Wound care instructions were updated, PT re-evaluation was performed and Pt was recommended for discharge to [facility] with wound care every other day until follow up with Dr. Ross in the office in two weeks.  Pt noted to have hyponatremia.  HCTZ held and 500cc NS IVF bolus given.  Repeat labs showed...................................     Pt is tolerating diet, having bowel function, voiding freely, ambulating with assistance and is deemed safe for discharge to [facility].    Pt was transferred to [facility] from [facility] [place] after a slip and fall in the shower. Pt was found to have a degloving injury to her LLE and underwent a debridement of LLE wound using concurrent irrigation and suction device with a split-thickness autograft of left thigh on 4/24. Wound VAC was placed in the OR and Pt was NWB to [facility] post-op. Pt tolerated operation well, had her wound VAC removed on 4/30. Wound care instructions were updated, PT re-evaluation was performed and Pt was recommended for discharge to [facility] with wound care every other day until follow up with Dr. Ross in the office in two weeks. Pt noted to have hyponatremia. HCTZ held and 500cc NS IVF bolus given. Repeat labs showed stable low sodium of 128, improved to 130.    Pt is tolerating diet, having bowel function, voiding freely, ambulating with assistance and is deemed safe for discharge to [facility].

## 2025-04-30 NOTE — DISCHARGE NOTE PROVIDER - NSDCFUSCHEDAPPT_GEN_ALL_CORE_FT
Chin Scales  [facility]  [facility] [address] [facility] [address]  Scheduled Appointment: 06/27/2025

## 2025-04-30 NOTE — DISCHARGE NOTE PROVIDER - NSDCMRMEDTOKEN_GEN_ALL_CORE_FT
Co Q-10 100 mg oral capsule: 1 cap(s) orally once a day  levothyroxine 100 mcg (0.1 mg) oral tablet: 1 tab(s) orally once a day  Naprosyn 500 mg oral tablet: 1 tab(s) orally 2 times a day   Percocet 5/325 oral tablet: 1 tab(s) orally every 6 hours, As Needed -for severe pain MDD:4 tabs   quinapril-hydrochlorothiazide 20mg-12.5mg oral tablet: 1 tab(s) orally once a day  Vitamin C, E, and Neha Hips oral capsule: 1 cap(s) orally once a day  Vitamin D3 1000 intl units oral tablet: 1 tab(s) orally once a day   acetaminophen 325 mg oral tablet: 3 tab(s) orally every 6 hours  Co Q-10 100 mg oral capsule: 1 cap(s) orally once a day  freetext medication     -: 1 Apply topically to affected area every 12 hours As needed muscle cramps  ibuprofen 600 mg oral tablet: 1 tab(s) orally every 6 hours As needed Mild Pain (1 - 3)  levothyroxine 100 mcg (0.1 mg) oral tablet: 1 tab(s) orally once a day  melatonin 5 mg oral tablet: 1 tab(s) orally once a day (at bedtime)  methocarbamol 500 mg oral tablet: 1 tab(s) orally every 6 hours As needed Muscle Spasm  Multiple Vitamins with Minerals oral tablet: 1 tab(s) orally once a day  Percocet 5/325 oral tablet: 1 tab(s) orally every 6 hours, As Needed -for severe pain MDD:4 tabs   quinapril-hydrochlorothiazide 20mg-12.5mg oral tablet: 1 tab(s) orally once a day  senna leaf extract oral tablet: 2 tab(s) orally once a day (at bedtime)  Vitamin C, E, and Neha Hips oral capsule: 1 cap(s) orally once a day  Vitamin D3 1000 intl units oral tablet: 1 tab(s) orally once a day   acetaminophen 325 mg oral tablet: 3 tab(s) orally every 6 hours  Co Q-10 100 mg oral capsule: 1 cap(s) orally once a day  ibuprofen 600 mg oral tablet: 1 tab(s) orally every 6 hours As needed Mild Pain (1 - 3)  levothyroxine 100 mcg (0.1 mg) oral tablet: 1 tab(s) orally once a day  lisinopril 20 mg oral tablet: 1 tab(s) orally once a day  melatonin 5 mg oral tablet: 1 tab(s) orally once a day (at bedtime)  methocarbamol 500 mg oral tablet: 1 tab(s) orally every 6 hours As needed Muscle Spasm  Multiple Vitamins with Minerals oral tablet: 1 tab(s) orally once a day  Percocet 5/325 oral tablet: 1 tab(s) orally every 6 hours, As Needed -for severe pain MDD:4 tabs   senna leaf extract oral tablet: 2 tab(s) orally once a day (at bedtime)  Vitamin C, E, and Neha Hips oral capsule: 1 cap(s) orally once a day  Vitamin D3 1000 intl units oral tablet: 1 tab(s) orally once a day

## 2025-04-30 NOTE — DISCHARGE NOTE PROVIDER - NSDCCPCAREPLAN_GEN_ALL_CORE_FT
PRINCIPAL DISCHARGE DIAGNOSIS  Diagnosis: Degloving injury  Assessment and Plan of Treatment: Follow up: Please call and make an appointment with Dr. Ross 14 days after discharge. The office number is [phone]. Also, please call and make an appointment with your primary care physician as per your usual schedule.   Activity: May return to normal activities as tolerated. Please, avoid any trauma to you left leg.   Diet: May continue regular diet.  Medications: Please take all medications listed on your discharge. Over the counter pain medications such as Tylenol and Ibuprofen for pain management.   Wound Care: Please, keep wound site clean and dry. You may shower once left thigh wound is dry. Please, do not take baths, do not swim in the pool/oceat/etc, no sauna.   Left lateral thigh = donor site is covered with a xeroform that secured by sutures. Please, do not remove.   Wound care:   Left lateral thigh = donor site is covered with a xeroform that is secured by sutures. Please, do not remove. Please leave the left lateral thigh donor site open to air. Keep clean and dry, Ok to pat dry with gauze if oozing. May be covered with an ABD pad while ambulating.   Left lateral calf graft site: please, change dressing every other day for two weeks following the discharge until the office follow-up with Dr. Ross.   Dressing: single layer of Xeroform in the shape of the wound, ABD pad x2, ACE wrap from the foot to the knee.   Weight bearing as tolerated to LLE. Avoid trauma to LLE.  Please, call Dr. Ross's office to schedule a follow up appointment in 2 weeks [phone].  Patient is advised to RETURN TO THE EMERGENCY DEPARTMENT for any of the following - worsening pain, fever/chills, nausea/vomiting, altered mental status, chest pain, shortness of breath, or any other new / worsening symptom.

## 2025-04-30 NOTE — DIETITIAN INITIAL EVALUATION ADULT - ADD RECOMMEND
1. Continue regular diet as tolerated.   2. Consider ferrous sulfate supplementation d/t iron panel results.   3. Recommend daily MVI and ascorbic acid 500 mg to promote wound healing.   4. Monitor PO intake, weight trends, nutrition related labs, skin integrity

## 2025-04-30 NOTE — DIETITIAN INITIAL EVALUATION ADULT - CALCULATED TO (G/KG)
85.41
Alert-The patient is alert, awake and responds to voice. The patient is oriented to time, place, and person. The triage nurse is able to obtain subjective information.

## 2025-05-01 LAB
ACANTHOCYTES BLD QL SMEAR: SLIGHT — SIGNIFICANT CHANGE UP
ANION GAP SERPL CALC-SCNC: 10 MMOL/L — SIGNIFICANT CHANGE UP (ref 5–17)
ANION GAP SERPL CALC-SCNC: 11 MMOL/L — SIGNIFICANT CHANGE UP (ref 5–17)
ANISOCYTOSIS BLD QL: SLIGHT — SIGNIFICANT CHANGE UP
BASOPHILS # BLD AUTO: 0.04 K/UL — SIGNIFICANT CHANGE UP (ref 0–0.2)
BASOPHILS # BLD MANUAL: 0.09 K/UL — SIGNIFICANT CHANGE UP (ref 0–0.2)
BASOPHILS NFR BLD AUTO: 0.7 % — SIGNIFICANT CHANGE UP (ref 0–2)
BASOPHILS NFR BLD MANUAL: 1.7 % — SIGNIFICANT CHANGE UP (ref 0–2)
BUN SERPL-MCNC: 12.4 MG/DL — SIGNIFICANT CHANGE UP (ref 8–20)
BUN SERPL-MCNC: 13.3 MG/DL — SIGNIFICANT CHANGE UP (ref 8–20)
BURR CELLS BLD QL SMEAR: SLIGHT — SIGNIFICANT CHANGE UP
CALCIUM SERPL-MCNC: 8.6 MG/DL — SIGNIFICANT CHANGE UP (ref 8.4–10.5)
CALCIUM SERPL-MCNC: 8.6 MG/DL — SIGNIFICANT CHANGE UP (ref 8.4–10.5)
CHLORIDE SERPL-SCNC: 94 MMOL/L — LOW (ref 96–108)
CHLORIDE SERPL-SCNC: 95 MMOL/L — LOW (ref 96–108)
CO2 SERPL-SCNC: 22 MMOL/L — SIGNIFICANT CHANGE UP (ref 22–29)
CO2 SERPL-SCNC: 24 MMOL/L — SIGNIFICANT CHANGE UP (ref 22–29)
CREAT SERPL-MCNC: 0.97 MG/DL — SIGNIFICANT CHANGE UP (ref 0.5–1.3)
CREAT SERPL-MCNC: 0.98 MG/DL — SIGNIFICANT CHANGE UP (ref 0.5–1.3)
EGFR: 52 ML/MIN/1.73M2 — LOW
EGFR: 52 ML/MIN/1.73M2 — LOW
EGFR: 53 ML/MIN/1.73M2 — LOW
EGFR: 53 ML/MIN/1.73M2 — LOW
ELLIPTOCYTES BLD QL SMEAR: ABNORMAL
EOSINOPHIL # BLD AUTO: 0.18 K/UL — SIGNIFICANT CHANGE UP (ref 0–0.5)
EOSINOPHIL # BLD MANUAL: 0.19 K/UL — SIGNIFICANT CHANGE UP (ref 0–0.5)
EOSINOPHIL NFR BLD AUTO: 3.2 % — SIGNIFICANT CHANGE UP (ref 0–6)
EOSINOPHIL NFR BLD MANUAL: 3.4 % — SIGNIFICANT CHANGE UP (ref 0–6)
GIANT PLATELETS BLD QL SMEAR: PRESENT
GLUCOSE SERPL-MCNC: 108 MG/DL — HIGH (ref 70–99)
GLUCOSE SERPL-MCNC: 96 MG/DL — SIGNIFICANT CHANGE UP (ref 70–99)
HCT VFR BLD CALC: 25.6 % — LOW (ref 34.5–45)
HGB BLD-MCNC: 8.3 G/DL — LOW (ref 11.5–15.5)
IMM GRANULOCYTES # BLD AUTO: 0.01 K/UL — SIGNIFICANT CHANGE UP (ref 0–0.07)
IMM GRANULOCYTES NFR BLD AUTO: 0.2 % — SIGNIFICANT CHANGE UP (ref 0–0.9)
LYMPHOCYTES # BLD AUTO: 0.85 K/UL — LOW (ref 1–3.3)
LYMPHOCYTES # BLD MANUAL: 0.33 K/UL — LOW (ref 1–3.3)
LYMPHOCYTES NFR BLD AUTO: 15.3 % — SIGNIFICANT CHANGE UP (ref 13–44)
LYMPHOCYTES NFR BLD MANUAL: 5.9 % — LOW (ref 13–44)
MAGNESIUM SERPL-MCNC: 1.6 MG/DL — SIGNIFICANT CHANGE UP (ref 1.6–2.6)
MANUAL NEUTROPHIL BANDS #: 0.04 K/UL — SIGNIFICANT CHANGE UP (ref 0–0.84)
MANUAL REACTIVE LYMPHOCYTES #: 0.28 K/UL — SIGNIFICANT CHANGE UP (ref 0–0.63)
MCHC RBC-ENTMCNC: 27.5 PG — SIGNIFICANT CHANGE UP (ref 27–34)
MCHC RBC-ENTMCNC: 32.4 G/DL — SIGNIFICANT CHANGE UP (ref 32–36)
MCV RBC AUTO: 84.8 FL — SIGNIFICANT CHANGE UP (ref 80–100)
MONOCYTES # BLD AUTO: 1.07 K/UL — HIGH (ref 0–0.9)
MONOCYTES # BLD MANUAL: 0.51 K/UL — SIGNIFICANT CHANGE UP (ref 0–0.9)
MONOCYTES NFR BLD AUTO: 19.2 % — HIGH (ref 2–14)
MONOCYTES NFR BLD MANUAL: 9.2 % — SIGNIFICANT CHANGE UP (ref 2–14)
NEUTROPHILS # BLD AUTO: 3.41 K/UL — SIGNIFICANT CHANGE UP (ref 1.8–7.4)
NEUTROPHILS # BLD MANUAL: 4.11 K/UL — SIGNIFICANT CHANGE UP (ref 1.8–7.4)
NEUTROPHILS NFR BLD AUTO: 61.4 % — SIGNIFICANT CHANGE UP (ref 43–77)
NEUTROPHILS NFR BLD MANUAL: 74 % — SIGNIFICANT CHANGE UP (ref 43–77)
NEUTS BAND # BLD: 0.8 % — SIGNIFICANT CHANGE UP (ref 0–8)
NEUTS BAND NFR BLD: 0.8 % — SIGNIFICANT CHANGE UP (ref 0–8)
NRBC # BLD AUTO: 0 K/UL — SIGNIFICANT CHANGE UP (ref 0–0)
NRBC # FLD: 0 K/UL — SIGNIFICANT CHANGE UP (ref 0–0)
NRBC BLD AUTO-RTO: 0 /100 WBCS — SIGNIFICANT CHANGE UP (ref 0–0)
OSMOLALITY SERPL: 275 MOSMOL/KG — LOW (ref 280–301)
OVALOCYTES BLD QL SMEAR: ABNORMAL
PHOSPHATE SERPL-MCNC: 2.8 MG/DL — SIGNIFICANT CHANGE UP (ref 2.4–4.7)
PLAT MORPH BLD: NORMAL — SIGNIFICANT CHANGE UP
PLATELET # BLD AUTO: 322 K/UL — SIGNIFICANT CHANGE UP (ref 150–400)
PMV BLD: 9.4 FL — SIGNIFICANT CHANGE UP (ref 7–13)
POIKILOCYTOSIS BLD QL AUTO: ABNORMAL
POLYCHROMASIA BLD QL SMEAR: SLIGHT — SIGNIFICANT CHANGE UP
POTASSIUM SERPL-MCNC: 4.1 MMOL/L — SIGNIFICANT CHANGE UP (ref 3.5–5.3)
POTASSIUM SERPL-MCNC: 4.3 MMOL/L — SIGNIFICANT CHANGE UP (ref 3.5–5.3)
POTASSIUM SERPL-SCNC: 4.1 MMOL/L — SIGNIFICANT CHANGE UP (ref 3.5–5.3)
POTASSIUM SERPL-SCNC: 4.3 MMOL/L — SIGNIFICANT CHANGE UP (ref 3.5–5.3)
RBC # BLD: 3.02 M/UL — LOW (ref 3.8–5.2)
RBC # FLD: 16.5 % — HIGH (ref 10.3–14.5)
RBC BLD AUTO: ABNORMAL
SCHISTOCYTES BLD QL AUTO: SLIGHT — SIGNIFICANT CHANGE UP
SODIUM SERPL-SCNC: 128 MMOL/L — LOW (ref 135–145)
SODIUM SERPL-SCNC: 128 MMOL/L — LOW (ref 135–145)
VARIANT LYMPHS # BLD: 5 % — SIGNIFICANT CHANGE UP (ref 0–6)
VARIANT LYMPHS NFR BLD MANUAL: 5 % — SIGNIFICANT CHANGE UP (ref 0–6)
WBC # BLD: 5.56 K/UL — SIGNIFICANT CHANGE UP (ref 3.8–10.5)
WBC # FLD AUTO: 5.56 K/UL — SIGNIFICANT CHANGE UP (ref 3.8–10.5)

## 2025-05-01 PROCEDURE — 99231 SBSQ HOSP IP/OBS SF/LOW 25: CPT

## 2025-05-01 RX ORDER — IBUPROFEN 200 MG
1 TABLET ORAL
Qty: 0 | Refills: 0 | DISCHARGE
Start: 2025-05-01

## 2025-05-01 RX ORDER — CYST/ALA/Q10/PHOS.SER/DHA/BROC 100-20-50
1 POWDER (GRAM) ORAL
Qty: 0 | Refills: 0 | DISCHARGE
Start: 2025-05-01

## 2025-05-01 RX ORDER — SENNA 187 MG
2 TABLET ORAL
Qty: 0 | Refills: 0 | DISCHARGE
Start: 2025-05-01

## 2025-05-01 RX ORDER — METHOCARBAMOL 500 MG/1
1 TABLET, FILM COATED ORAL
Qty: 0 | Refills: 0 | DISCHARGE
Start: 2025-05-01

## 2025-05-01 RX ORDER — MELATONIN 5 MG
1 TABLET ORAL
Qty: 0 | Refills: 0 | DISCHARGE
Start: 2025-05-01

## 2025-05-01 RX ORDER — ACETAMINOPHEN 500 MG/5ML
3 LIQUID (ML) ORAL
Qty: 0 | Refills: 0 | DISCHARGE
Start: 2025-05-01

## 2025-05-01 RX ADMIN — LISINOPRIL 20 MILLIGRAM(S): 5 TABLET ORAL at 05:19

## 2025-05-01 RX ADMIN — Medication 1 TABLET(S): at 12:41

## 2025-05-01 RX ADMIN — ENOXAPARIN SODIUM 30 MILLIGRAM(S): 100 INJECTION SUBCUTANEOUS at 21:41

## 2025-05-01 RX ADMIN — Medication 100 MICROGRAM(S): at 05:19

## 2025-05-01 RX ADMIN — Medication 3000 MILLILITER(S): at 14:35

## 2025-05-02 ENCOUNTER — TRANSCRIPTION ENCOUNTER (OUTPATIENT)
Age: 89
End: 2025-05-02

## 2025-05-02 VITALS
OXYGEN SATURATION: 98 % | SYSTOLIC BLOOD PRESSURE: 120 MMHG | DIASTOLIC BLOOD PRESSURE: 80 MMHG | TEMPERATURE: 97 F | HEART RATE: 98 BPM

## 2025-05-02 LAB
ANION GAP SERPL CALC-SCNC: 11 MMOL/L — SIGNIFICANT CHANGE UP (ref 5–17)
BUN SERPL-MCNC: 11.4 MG/DL — SIGNIFICANT CHANGE UP (ref 8–20)
CALCIUM SERPL-MCNC: 8.9 MG/DL — SIGNIFICANT CHANGE UP (ref 8.4–10.5)
CHLORIDE SERPL-SCNC: 94 MMOL/L — LOW (ref 96–108)
CO2 SERPL-SCNC: 25 MMOL/L — SIGNIFICANT CHANGE UP (ref 22–29)
CREAT SERPL-MCNC: 1.03 MG/DL — SIGNIFICANT CHANGE UP (ref 0.5–1.3)
EGFR: 49 ML/MIN/1.73M2 — LOW
EGFR: 49 ML/MIN/1.73M2 — LOW
GLUCOSE SERPL-MCNC: 101 MG/DL — HIGH (ref 70–99)
MAGNESIUM SERPL-MCNC: 1.5 MG/DL — LOW (ref 1.6–2.6)
PHOSPHATE SERPL-MCNC: 2.6 MG/DL — SIGNIFICANT CHANGE UP (ref 2.4–4.7)
POTASSIUM SERPL-MCNC: 4.1 MMOL/L — SIGNIFICANT CHANGE UP (ref 3.5–5.3)
POTASSIUM SERPL-SCNC: 4.1 MMOL/L — SIGNIFICANT CHANGE UP (ref 3.5–5.3)
SODIUM SERPL-SCNC: 130 MMOL/L — LOW (ref 135–145)

## 2025-05-02 PROCEDURE — 85025 COMPLETE CBC W/AUTO DIFF WBC: CPT

## 2025-05-02 PROCEDURE — 84443 ASSAY THYROID STIM HORMONE: CPT

## 2025-05-02 PROCEDURE — 99231 SBSQ HOSP IP/OBS SF/LOW 25: CPT

## 2025-05-02 PROCEDURE — P9016: CPT

## 2025-05-02 PROCEDURE — 36430 TRANSFUSION BLD/BLD COMPNT: CPT

## 2025-05-02 PROCEDURE — 86923 COMPATIBILITY TEST ELECTRIC: CPT

## 2025-05-02 PROCEDURE — 82553 CREATINE MB FRACTION: CPT

## 2025-05-02 PROCEDURE — 82607 VITAMIN B-12: CPT

## 2025-05-02 PROCEDURE — 83935 ASSAY OF URINE OSMOLALITY: CPT

## 2025-05-02 PROCEDURE — 93970 EXTREMITY STUDY: CPT

## 2025-05-02 PROCEDURE — 99285 EMERGENCY DEPT VISIT HI MDM: CPT | Mod: 25

## 2025-05-02 PROCEDURE — 84481 FREE ASSAY (FT-3): CPT

## 2025-05-02 PROCEDURE — 83550 IRON BINDING TEST: CPT

## 2025-05-02 PROCEDURE — 82728 ASSAY OF FERRITIN: CPT

## 2025-05-02 PROCEDURE — 85027 COMPLETE CBC AUTOMATED: CPT

## 2025-05-02 PROCEDURE — 83615 LACTATE (LD) (LDH) ENZYME: CPT

## 2025-05-02 PROCEDURE — 36415 COLL VENOUS BLD VENIPUNCTURE: CPT

## 2025-05-02 PROCEDURE — 83010 ASSAY OF HAPTOGLOBIN QUANT: CPT

## 2025-05-02 PROCEDURE — 86901 BLOOD TYPING SEROLOGIC RH(D): CPT

## 2025-05-02 PROCEDURE — 93005 ELECTROCARDIOGRAM TRACING: CPT

## 2025-05-02 PROCEDURE — 82436 ASSAY OF URINE CHLORIDE: CPT

## 2025-05-02 PROCEDURE — 83735 ASSAY OF MAGNESIUM: CPT

## 2025-05-02 PROCEDURE — 83930 ASSAY OF BLOOD OSMOLALITY: CPT

## 2025-05-02 PROCEDURE — 84300 ASSAY OF URINE SODIUM: CPT

## 2025-05-02 PROCEDURE — 86900 BLOOD TYPING SEROLOGIC ABO: CPT

## 2025-05-02 PROCEDURE — 85610 PROTHROMBIN TIME: CPT

## 2025-05-02 PROCEDURE — 85730 THROMBOPLASTIN TIME PARTIAL: CPT

## 2025-05-02 PROCEDURE — 80061 LIPID PANEL: CPT

## 2025-05-02 PROCEDURE — 80048 BASIC METABOLIC PNL TOTAL CA: CPT

## 2025-05-02 PROCEDURE — 84439 ASSAY OF FREE THYROXINE: CPT

## 2025-05-02 PROCEDURE — 82550 ASSAY OF CK (CPK): CPT

## 2025-05-02 PROCEDURE — 83540 ASSAY OF IRON: CPT

## 2025-05-02 PROCEDURE — 84466 ASSAY OF TRANSFERRIN: CPT

## 2025-05-02 PROCEDURE — 80053 COMPREHEN METABOLIC PANEL: CPT

## 2025-05-02 PROCEDURE — 86850 RBC ANTIBODY SCREEN: CPT

## 2025-05-02 PROCEDURE — 84100 ASSAY OF PHOSPHORUS: CPT

## 2025-05-02 PROCEDURE — 82746 ASSAY OF FOLIC ACID SERUM: CPT

## 2025-05-02 RX ORDER — MAGNESIUM OXIDE 400 MG
400 TABLET ORAL ONCE
Refills: 0 | Status: COMPLETED | OUTPATIENT
Start: 2025-05-02 | End: 2025-05-02

## 2025-05-02 RX ORDER — LISINOPRIL 5 MG/1
1 TABLET ORAL
Qty: 0 | Refills: 0 | DISCHARGE
Start: 2025-05-02

## 2025-05-02 RX ADMIN — Medication 1 TABLET(S): at 11:39

## 2025-05-02 RX ADMIN — Medication 400 MILLIGRAM(S): at 11:38

## 2025-05-02 RX ADMIN — Medication 100 MICROGRAM(S): at 05:35

## 2025-05-02 RX ADMIN — LISINOPRIL 20 MILLIGRAM(S): 5 TABLET ORAL at 05:35

## 2025-05-02 NOTE — PROGRESS NOTE ADULT - PROVIDER SPECIALTY LIST ADULT
Hospitalist
Surgery
Trauma Surgery
Hospitalist
Hospitalist
Trauma Surgery

## 2025-05-02 NOTE — DISCHARGE NOTE NURSING/CASE MANAGEMENT/SOCIAL WORK - NSDCFUADDAPPT_GEN_ALL_CORE_FT
Discharge to [facility] at [facility] at [facility]   [address]  [facility], [place] [address] [phone]

## 2025-05-02 NOTE — PROGRESS NOTE ADULT - NS ATTEND AMEND GEN_ALL_CORE FT
Above assessment noted.  The patient was seen and examined by myself with the surgical PA.  The patient is stable and without new events or complaints  following a skin graft for a left lower leg wound.  The wound dressing remains intact and clean with no saturation of gross bleeding.  Wound VAC remains in place.  Continue with wound VAC. A total of 10 minutes was spent coordinating the patient's care.
Patient seen and examined at bedside. No acute events overnight. Denies any nausea or vomiting. Pain is well controlled. Afebrile. Patient was found to be hyponatremic this AM.    discontinue HCTZ  replete 500 of NS  repeat bmp in the afternoon  SCDs and LVNX for DVT ppx  Dispo planning pending home VNS
Patient seen and examined at bedside. No acute events overnight. Denies any nausea or vomiting. Pain is well controlled. Afebrile. Patient was found to be hyponatremia improved.    continue to hold HCTZ  SCDs and LVNX for DVT ppx  Patient is surgically stable for discharge  Dispo planning
Above assessment noted.  The patient was seen and examined by myself with the surgical PA.  The patient is POD 1 s/p skin graft to a degloving injury of the left lower leg.  the patient had reports of bleeding from the left thigh donor site.  the dressing was examined and found to be without saturation.   Wound VAC remains in place to the left lower leg.  Will monitor for bleeding, maintain dressings for now unless signs of saturation and bleeding.  A total of 10 minutes was spent coordinating the patient's care.
Patient seen and examined at bedside. No acute events overnight. Denies any nausea or vomiting. Pain is well controlled. Afebrile. Tolerating diet    Continue diet as tolerated  dressing managed by Plastics  F/U LLE Duplex  SCDs and LVNX for DVT ppx  wound vac kept in place until 5/2

## 2025-05-02 NOTE — PROGRESS NOTE ADULT - REASON FOR ADMISSION
Degloving injury to LLE

## 2025-05-02 NOTE — DISCHARGE NOTE NURSING/CASE MANAGEMENT/SOCIAL WORK - PATIENT PORTAL LINK FT
You can access the FollowMyHealth Patient Portal offered by [facility] by registering at the following website: [email or web address]. By joining [facility]’s FollowMyHealth portal, you will also be able to view your health information using other applications (apps) compatible with our system.

## 2025-05-02 NOTE — PROGRESS NOTE ADULT - SUBJECTIVE AND OBJECTIVE BOX
HPI/OVERNIGHT EVENTS:  Patient seen and examined at bedside this AM. Complained of some cramping to LLE overnight. Denies fever, chills, nausea, or vomiting.     Vital Signs Last 24 Hrs  T(C): 36.3 (28 Apr 2025 09:09), Max: 36.5 (28 Apr 2025 04:00)  T(F): 97.4 (28 Apr 2025 09:09), Max: 97.7 (28 Apr 2025 04:00)  HR: 88 (28 Apr 2025 09:09) (72 - 88)  BP: 134/55 (28 Apr 2025 09:09) (132/68 - 211/85)  BP(mean): --  RR: 18 (28 Apr 2025 09:09) (17 - 18)  SpO2: 94% (28 Apr 2025 09:09) (91% - 95%)    Parameters below as of 28 Apr 2025 09:09  Patient On (Oxygen Delivery Method): room air        I&O's Detail    27 Apr 2025 07:01  -  28 Apr 2025 07:00  --------------------------------------------------------  IN:    Oral Fluid: 900 mL    sodium chloride 0.9%: 825 mL  Total IN: 1725 mL    OUT:    VAC (Vacuum Assisted Closure) System (mL): 40 mL    Voided (mL): 2800 mL  Total OUT: 2840 mL    Total NET: -1115 mL          MEDICATIONS  (STANDING):  acetaminophen     Tablet .. 975 milliGRAM(s) Oral every 6 hours  enoxaparin Injectable 30 milliGRAM(s) SubCutaneous every 24 hours  hydrochlorothiazide 12.5 milliGRAM(s) Oral daily  influenza  Vaccine (HIGH DOSE) 0.5 milliLiter(s) IntraMuscular once  levothyroxine 100 MICROGram(s) Oral daily  lisinopril 20 milliGRAM(s) Oral daily  melatonin 5 milliGRAM(s) Oral at bedtime  multivitamin/minerals 1 Tablet(s) Oral daily  polyethylene glycol 3350 17 Gram(s) Oral daily  senna 2 Tablet(s) Oral at bedtime  sodium chloride 0.9%. 1000 milliLiter(s) (75 mL/Hr) IV Continuous <Continuous>    MEDICATIONS  (PRN):  HYDROmorphone  Injectable 0.5 milliGRAM(s) IV Push every 3 hours PRN Severe Pain (7 - 10)  ibuprofen  Tablet. 600 milliGRAM(s) Oral every 6 hours PRN Mild Pain (1 - 3)  methocarbamol 500 milliGRAM(s) Oral every 6 hours PRN Muscle Spasm  Muscle cramps foam 1 Application(s) 1 Application(s) Topical every 12 hours PRN muscle cramps      PHYSICAL EXAM:  Constitutional: Patient resting comfortably in bed, NAD, asleep during rounds.  HEENT: EOMI, moist mucous membranes   Respiratory: non-labored breathing on RA  Skin: LLE wrapped in ACE bandage, wound vac in place    LABS:                        9.1    6.39  )-----------( 319      ( 28 Apr 2025 04:50 )             27.8     04-28    129[L]  |  95[L]  |  19.0  ----------------------------<  100[H]  4.5   |  25.0  |  1.00    Ca    8.8      28 Apr 2025 04:50  Phos  2.4     04-28  Mg     1.8     04-28          Urinalysis Basic - ( 28 Apr 2025 04:50 )    Color: x / Appearance: x / SG: x / pH: x  Gluc: 100 mg/dL / Ketone: x  / Bili: x / Urobili: x   Blood: x / Protein: x / Nitrite: x   Leuk Esterase: x / RBC: x / WBC x   Sq Epi: x / Non Sq Epi: x / Bacteria: x      Assessment: 89 y/o female POD4 s/p LLE wound debridement and split thickness autograft placement and WV after a fall with skin avulsion to LLE on 4/23.     Plan:  - f/u read on LE duplex scans to r/o DVT   - monitor for hemorrhage from donor site and wound   - wound vac kept in place until 5/2   - left thigh donor site dressing kept in place until 5/2  - NWB to LLE  - keep LLE elevated   - Notify Dr. Ross if any issues with WV/donor site   - regular diet   - pain control PRN   - IS   - encourage OOB/chair   - PT: [facility]  - f/u OT   - DVT ppx: lovenox, SCDs  - Dispo: [facility]
INTERVAL HPI/OVERNIGHT EVENTS:    Patient evaluated at bedside. No acute distress. No acute events overnight.    MEDICATIONS  (STANDING):  acetaminophen     Tablet .. 975 milliGRAM(s) Oral every 6 hours  enoxaparin Injectable 30 milliGRAM(s) SubCutaneous every 24 hours  hydrochlorothiazide 12.5 milliGRAM(s) Oral daily  influenza  Vaccine (HIGH DOSE) 0.5 milliLiter(s) IntraMuscular once  levothyroxine 100 MICROGram(s) Oral daily  lisinopril 20 milliGRAM(s) Oral daily  melatonin 5 milliGRAM(s) Oral at bedtime  multivitamin/minerals 1 Tablet(s) Oral daily  polyethylene glycol 3350 17 Gram(s) Oral daily  senna 2 Tablet(s) Oral at bedtime  sodium chloride 0.9%. 1000 milliLiter(s) (75 mL/Hr) IV Continuous <Continuous>    MEDICATIONS  (PRN):  HYDROmorphone  Injectable 0.5 milliGRAM(s) IV Push every 3 hours PRN Severe Pain (7 - 10)  ibuprofen  Tablet. 600 milliGRAM(s) Oral every 6 hours PRN Mild Pain (1 - 3)  methocarbamol 500 milliGRAM(s) Oral every 6 hours PRN Muscle Spasm  Muscle cramps foam 1 Application(s) 1 Application(s) Topical every 12 hours PRN muscle cramps      Vital Signs Last 24 Hrs  T(C): 36.5 (29 Apr 2025 04:02), Max: 36.7 (29 Apr 2025 00:45)  T(F): 97.7 (29 Apr 2025 04:02), Max: 98.1 (29 Apr 2025 00:45)  HR: 88 (29 Apr 2025 04:02) (73 - 91)  BP: 171/72 (29 Apr 2025 04:02) (134/55 - 171/72)  BP(mean): --  RR: 18 (29 Apr 2025 04:02) (17 - 18)  SpO2: 92% (29 Apr 2025 04:02) (92% - 96%)    Parameters below as of 29 Apr 2025 04:02  Patient On (Oxygen Delivery Method): room air        Constitutional: NAD  Respiratory: Nonlabored   Cardiovascular: Regular rate & rhythm  Gastrointestinal: Soft, non-tender, no hepatosplenomegaly, normal bowel sounds  Donor site dressing intact, skin graft and vac with great seal       I&O's Detail    28 Apr 2025 07:01  -  29 Apr 2025 07:00  --------------------------------------------------------  IN:    Oral Fluid: 600 mL    sodium chloride 0.9%: 900 mL  Total IN: 1500 mL    OUT:    Voided (mL): 2500 mL  Total OUT: 2500 mL    Total NET: -1000 mL          LABS:                        9.0    6.09  )-----------( 262      ( 29 Apr 2025 04:59 )             28.0     04-29    132[L]  |  97  |  13.1  ----------------------------<  95  4.4   |  24.0  |  0.93    Ca    8.9      29 Apr 2025 04:59  Phos  2.9     04-29  Mg     1.8     04-29        Urinalysis Basic - ( 29 Apr 2025 04:59 )    Color: x / Appearance: x / SG: x / pH: x  Gluc: 95 mg/dL / Ketone: x  / Bili: x / Urobili: x   Blood: x / Protein: x / Nitrite: x   Leuk Esterase: x / RBC: x / WBC x   Sq Epi: x / Non Sq Epi: x / Bacteria: x        RADIOLOGY & ADDITIONAL STUDIES:
INTERVAL HPI/OVERNIGHT EVENTS:    Patient evaluated at bedside. No acute distress. No acute events overnight. Pt is recovering well post-op.     MEDICATIONS  (STANDING):  acetaminophen     Tablet .. 975 milliGRAM(s) Oral every 6 hours  ceFAZolin  Injectable. 500 milliGRAM(s) IV Push <User Schedule>  enoxaparin Injectable 30 milliGRAM(s) SubCutaneous every 24 hours  influenza  Vaccine (HIGH DOSE) 0.5 milliLiter(s) IntraMuscular once  levothyroxine 100 MICROGram(s) Oral daily  lisinopril 40 milliGRAM(s) Oral daily  melatonin 5 milliGRAM(s) Oral at bedtime  multivitamin/minerals 1 Tablet(s) Oral daily  polyethylene glycol 3350 17 Gram(s) Oral daily  senna 2 Tablet(s) Oral at bedtime    MEDICATIONS  (PRN):  HYDROmorphone  Injectable 0.5 milliGRAM(s) IV Push every 3 hours PRN Severe Pain (7 - 10)  ibuprofen  Tablet. 600 milliGRAM(s) Oral every 6 hours PRN Mild Pain (1 - 3)      Vital Signs Last 24 Hrs  T(C): 36.4 (25 Apr 2025 04:22), Max: 36.9 (24 Apr 2025 11:48)  T(F): 97.5 (25 Apr 2025 04:22), Max: 98.5 (24 Apr 2025 11:48)  HR: 90 (25 Apr 2025 04:22) (74 - 103)  BP: 155/72 (25 Apr 2025 04:22) (102/60 - 172/64)  BP(mean): 73 (24 Apr 2025 17:29) (67 - 74)  RR: 18 (25 Apr 2025 04:22) (11 - 18)  SpO2: 92% (25 Apr 2025 04:22) (91% - 98%)    Parameters below as of 25 Apr 2025 04:22  Patient On (Oxygen Delivery Method): room air        PHYSICAL EXAM:  GENERAL: NAD, well-groomed, well-developed  CHEST/LUNG: non-labored breathing on room air.   ABDOMEN: Soft, Nontender, Nondistended  EXTREMITIES:  LLE lateral thigh donor site w/ ACE in place cdi, wound vac to lateral lower leg wrapped w/ ACE, cdi with good suction         I&O's Detail    24 Apr 2025 07:01  -  25 Apr 2025 07:00  --------------------------------------------------------  IN:    Lactated Ringers: 375 mL    Oral Fluid: 100 mL  Total IN: 475 mL    OUT:    VAC (Vacuum Assisted Closure) System (mL): 0 mL    Voided (mL): 1250 mL  Total OUT: 1250 mL    Total NET: -775 mL          LABS:                        8.4    5.91  )-----------( 309      ( 24 Apr 2025 06:43 )             26.1     04-24    135  |  99  |  19.5  ----------------------------<  99  4.6   |  25.0  |  1.13    Ca    9.3      24 Apr 2025 06:43  Phos  3.0     04-24  Mg     1.6     04-24    TPro  5.8[L]  /  Alb  3.3  /  TBili  0.5  /  DBili  x   /  AST  19  /  ALT  6   /  AlkPhos  61  04-24    PT/INR - ( 23 Apr 2025 21:35 )   PT: 12.0 sec;   INR: 1.03 ratio         PTT - ( 23 Apr 2025 21:35 )  PTT:27.2 sec  Urinalysis Basic - ( 24 Apr 2025 06:43 )    Color: x / Appearance: x / SG: x / pH: x  Gluc: 99 mg/dL / Ketone: x  / Bili: x / Urobili: x   Blood: x / Protein: x / Nitrite: x   Leuk Esterase: x / RBC: x / WBC x   Sq Epi: x / Non Sq Epi: x / Bacteria: x    
Patient seen and examined . Found sitting on the chair , comfortable , denies pain , denies n/v , voiding , last BM on 4/24 ,   tolerating diet. S/P 1 U PRBC on 4/26 - tolerated well     CC : as above         MEDICATIONS  (STANDING):  acetaminophen     Tablet .. 975 milliGRAM(s) Oral every 6 hours  enoxaparin Injectable 30 milliGRAM(s) SubCutaneous every 24 hours  influenza  Vaccine (HIGH DOSE) 0.5 milliLiter(s) IntraMuscular once  levothyroxine 100 MICROGram(s) Oral daily  melatonin 5 milliGRAM(s) Oral at bedtime  multivitamin/minerals 1 Tablet(s) Oral daily  polyethylene glycol 3350 17 Gram(s) Oral daily  senna 2 Tablet(s) Oral at bedtime  sodium chloride 0.9%. 1000 milliLiter(s) (75 mL/Hr) IV Continuous <Continuous>    MEDICATIONS  (PRN):  HYDROmorphone  Injectable 0.5 milliGRAM(s) IV Push every 3 hours PRN Severe Pain (7 - 10)  ibuprofen  Tablet. 600 milliGRAM(s) Oral every 6 hours PRN Mild Pain (1 - 3)  methocarbamol 500 milliGRAM(s) Oral every 6 hours PRN Muscle Spasm      LABS:                          9.0    5.52  )-----------( 316      ( 27 Apr 2025 05:16 )             27.6     04-27    133[L]  |  98  |  22.6[H]  ----------------------------<  91  4.7   |  25.0  |  1.12    Ca    8.9      27 Apr 2025 05:16  Phos  3.2     04-27  Mg     2.1     04-27    Thyroid Stimulating Hormone, Serum in AM (04.27.25 @ 05:16)    Thyroid Stimulating Hormone, Serum: 0.45 uIU/mL            REVIEW OF SYSTEMS:    As above , all other systems are reviewed and are negative .     I&O's Summary    26 Apr 2025 07:01  -  27 Apr 2025 07:00  --------------------------------------------------------  IN: 200 mL / OUT: 2600 mL / NET: -2400 mL    27 Apr 2025 07:01  -  27 Apr 2025 10:27  --------------------------------------------------------  IN: 0 mL / OUT: 200 mL / NET: -200 mL          Vital Signs Last 24 Hrs  T(C): 36.6 (27 Apr 2025 08:41), Max: 36.7 (26 Apr 2025 16:54)  T(F): 97.8 (27 Apr 2025 08:41), Max: 98.1 (26 Apr 2025 16:54)  HR: 74 (27 Apr 2025 08:41) (71 - 80)  BP: 160/78 (27 Apr 2025 08:41) (113/63 - 160/78)  BP(mean): --  RR: 18 (27 Apr 2025 08:41) (17 - 18)  SpO2: 97% (27 Apr 2025 08:41) (91% - 97%)    Parameters below as of 27 Apr 2025 08:41  Patient On (Oxygen Delivery Method): room air      PHYSICAL EXAM:    GENERAL: NAD,  HEAD:  Atraumatic, Normocephalic  EYES: EOMI, PERRLA, conjunctiva and sclera clear  NECK: Supple, No JVD, Normal thyroid  NERVOUS SYSTEM:  Alert & Oriented X3, no focal deficit  CHEST/LUNG: CTA b/l ,  no  rales, rhonchi, wheezing, or rubs  HEART: Regular rate and rhythm; No murmurs, rubs, or gallops  ABDOMEN: Soft, Nontender, Nondistended; Bowel sounds present  EXTREMITIES:  2+ Peripheral Pulses, No clubbing, cyanosis, or edema,   LLE with wound vac , and clean and dry ACE wrap   LYMPH: No lymphadenopathy noted  SKIN: No rashes or lesions  
Subjective patient evaluated and examined at the bedside. no overnight events. patient states that she feels well, pain well controlled       MEDICATIONS  (STANDING):  acetaminophen     Tablet .. 975 milliGRAM(s) Oral every 6 hours  enoxaparin Injectable 30 milliGRAM(s) SubCutaneous every 24 hours  influenza  Vaccine (HIGH DOSE) 0.5 milliLiter(s) IntraMuscular once  levothyroxine 100 MICROGram(s) Oral daily  lisinopril 20 milliGRAM(s) Oral daily  melatonin 5 milliGRAM(s) Oral at bedtime  multivitamin/minerals 1 Tablet(s) Oral daily  polyethylene glycol 3350 17 Gram(s) Oral daily  senna 2 Tablet(s) Oral at bedtime  sodium chloride 0.9%. 1000 milliLiter(s) (75 mL/Hr) IV Continuous <Continuous>    MEDICATIONS  (PRN):  HYDROmorphone  Injectable 0.5 milliGRAM(s) IV Push every 3 hours PRN Severe Pain (7 - 10)  ibuprofen  Tablet. 600 milliGRAM(s) Oral every 6 hours PRN Mild Pain (1 - 3)  methocarbamol 500 milliGRAM(s) Oral every 6 hours PRN Muscle Spasm  Muscle cramps foam 1 Application(s) 1 Application(s) Topical every 12 hours PRN muscle cramps      Vital Signs Last 24 Hrs  T(C): 36.7 (01 May 2025 05:00), Max: 36.8 (30 Apr 2025 08:35)  T(F): 98 (01 May 2025 05:00), Max: 98.3 (30 Apr 2025 21:34)  HR: 100 (01 May 2025 05:00) (79 - 100)  BP: 158/78 (01 May 2025 05:00) (113/70 - 162/70)  BP(mean): --  RR: 18 (01 May 2025 05:00) (18 - 19)  SpO2: 92% (01 May 2025 05:00) (92% - 95%)    Parameters below as of 01 May 2025 05:00  Patient On (Oxygen Delivery Method): room air        Physical Exam:    Constitutional: laying comfortably in bed  Respiratory: Respirations non-labored, no accessory muscle use  Extremities: L lateral thigh donor graft site c/d/i; LLE wrapped in ACE c/d/i      LABS:                        8.3    5.56  )-----------( 322      ( 01 May 2025 05:50 )             25.6     05-01    128[L]  |  94[L]  |  12.4  ----------------------------<  96  4.3   |  24.0  |  0.98    Ca    8.6      01 May 2025 05:50  Phos  2.8     05-01  Mg     1.6     05-01        Urinalysis Basic - ( 01 May 2025 05:50 )    Color: x / Appearance: x / SG: x / pH: x  Gluc: 96 mg/dL / Ketone: x  / Bili: x / Urobili: x   Blood: x / Protein: x / Nitrite: x   Leuk Esterase: x / RBC: x / WBC x   Sq Epi: x / Non Sq Epi: x / Bacteria: x      A: 89 y/o female s/p LLE wound debridement and split thickness autograft placement and WV on 4/24 after a fall with skin avulsion to LLE on 4/23. Wound vac removed on 4/30.     Plan:  - dvt ppx: Lov, SCDs on unaffected leg  - diet: reg   - monitor for hemorrhage from donor site and wound   - NWB to LLE  - keep LLE elevated   - Notify Dr. Ross if any issues with WV/donor site   - pain control PRN   - encourage OOB/chair + IS   - PT and OT: [facility], dispo planning for this week  - Dispo: [facility]    
Subjective:  Pt received 500cc IVF bolus yesterday ad HCTZ held. Na lateral overnight, up to 130 this am.  Pt expresses desire not to go to Rehab and would prefer to go home.       ICU Vital Signs Last 24 Hrs  T(C): 36.6 (02 May 2025 05:00), Max: 36.7 (01 May 2025 16:18)  T(F): 97.9 (02 May 2025 05:00), Max: 98 (01 May 2025 16:18)  HR: 90 (02 May 2025 05:00) (70 - 90)  BP: 158/75 (02 May 2025 05:00) (116/69 - 160/80)  BP(mean): --  ABP: --  ABP(mean): --  RR: 18 (02 May 2025 05:00) (17 - 18)  SpO2: 97% (02 May 2025 05:00) (91% - 97%)    O2 Parameters below as of 02 May 2025 05:00  Patient On (Oxygen Delivery Method): room air        I&O's Detail    01 May 2025 07:01  -  02 May 2025 07:00  --------------------------------------------------------  IN:    Oral Fluid: 200 mL  Total IN: 200 mL    OUT:    Voided (mL): 2475 mL  Total OUT: 2475 mL    Total NET: -2275 mL        MEDICATIONS  (STANDING):  acetaminophen     Tablet .. 975 milliGRAM(s) Oral every 6 hours  enoxaparin Injectable 30 milliGRAM(s) SubCutaneous every 24 hours  influenza  Vaccine (HIGH DOSE) 0.5 milliLiter(s) IntraMuscular once  levothyroxine 100 MICROGram(s) Oral daily  lisinopril 20 milliGRAM(s) Oral daily  magnesium oxide 400 milliGRAM(s) Oral once  melatonin 5 milliGRAM(s) Oral at bedtime  multivitamin/minerals 1 Tablet(s) Oral daily  polyethylene glycol 3350 17 Gram(s) Oral daily  senna 2 Tablet(s) Oral at bedtime  sodium chloride 0.9%. 1000 milliLiter(s) (75 mL/Hr) IV Continuous <Continuous>    MEDICATIONS  (PRN):  ibuprofen  Tablet. 600 milliGRAM(s) Oral every 6 hours PRN Mild Pain (1 - 3)  methocarbamol 500 milliGRAM(s) Oral every 6 hours PRN Muscle Spasm  Muscle cramps foam 1 Application(s) 1 Application(s) Topical every 12 hours PRN muscle cramps      PHYSICAL EXAM:  GENERAL: NAD, well-groomed, well-developed  Neuro:  GCS 15  HEAD:  Atraumatic, Normocephalic  CHEST/LUNG: non-labored breathing on room air.   CV:  NSR  ABDOMEN: Soft, Nontender, Nondistended  EXTREMITIES:  LLE w/ wound kaylie to L lat calf, L lat thigh donor site c/d/i. ACE wrap in place over LLE, no bleeding noted, motor and sensory intact on LLE        LABS:                    05-02    130[L]  |  94[L]  |  11.4  ----------------------------<  101[H]  4.1   |  25.0  |  1.03    Ca    8.9      02 May 2025 06:35  Phos  2.6     05-02  Mg     1.5     05-02                A: 89 y/o female s/p LLE wound debridement and split thickness autograft placement and WV on 4/24 after a fall with skin avulsion to LLE on 4/23. Wound vac removed on 4/30.     Plan:  -After continued conversation with pt, we encouraged her to participate with PT and consider [facility] to increase stability with ambulation and safe d/c home,  pt now amendable to d/c to [facility]    -Na improved to 130, d/c [facility] today, HOLD HCTZ until cleared by PMD.  F/U PMD 1 week from d/c.  Continue all other home meds  
Interval HPI/ Overnight  89 yo female with PMH of HTN and hypothyroidism trasferred to [facility] from [facility] a degloving injury of the left lower extremity. The patient fell on the shower last night and presented to the ED with skin avulsion of the left lower extremity. Pt states she has pain in the leg as well as generalized discomfort from laying in bed; she states Tylenol is not helping her pain. No other complaints.     MEDICATIONS  (STANDING):  acetaminophen     Tablet .. 975 milliGRAM(s) Oral every 6 hours  enoxaparin Injectable 40 milliGRAM(s) SubCutaneous every 24 hours  hydrochlorothiazide 12.5 milliGRAM(s) Oral daily  influenza  Vaccine (HIGH DOSE) 0.5 milliLiter(s) IntraMuscular once  lactated ringers. 1000 milliLiter(s) (75 mL/Hr) IV Continuous <Continuous>  levothyroxine 100 MICROGram(s) Oral daily  lisinopril 20 milliGRAM(s) Oral daily  senna 2 Tablet(s) Oral at bedtime      MEDICATIONS  (PRN):  HYDROmorphone  Injectable 0.5 milliGRAM(s) IV Push every 3 hours PRN Severe Pain (7 - 10)  ibuprofen  Tablet. 600 milliGRAM(s) Oral every 6 hours PRN Mild Pain (1 - 3)      VITAL SIGNS  Vital Signs Last 24 Hrs  T(C): 36.4 (24 Apr 2025 05:00), Max: 36.7 (23 Apr 2025 21:33)  T(F): 97.5 (24 Apr 2025 05:00), Max: 98 (23 Apr 2025 21:33)  HR: 78 (24 Apr 2025 05:00) (75 - 100)  BP: 146/70 (24 Apr 2025 05:00) (146/70 - 171/78)  BP(mean): --  RR: 18 (24 Apr 2025 05:00) (16 - 18)  SpO2: 94% (24 Apr 2025 05:00) (92% - 97%)    Parameters below as of 24 Apr 2025 05:00  Patient On (Oxygen Delivery Method): room air        PHYSICAL EXAM  T(C): 36.4 (04-24-25 @ 05:00), Max: 36.7 (04-23-25 @ 21:33)  HR: 78 (04-24-25 @ 05:00) (75 - 100)  BP: 146/70 (04-24-25 @ 05:00) (146/70 - 171/78)  RR: 18 (04-24-25 @ 05:00) (16 - 18)  SpO2: 94% (04-24-25 @ 05:00) (92% - 97%)    CONSTITUTIONAL: Well groomed, no apparent distress  ENMT: Oral mucosa with moist membranes.  RESP: Non-labored breathing. No Accessory muscle usage.  EXTREMITIES: LLE wrapped from knee to ankle. +DP pulses present B/L.  PSYCH: Appropriate insight/judgment; A+O x 3, mood and affect appropriate.      LABS                        8.8    8.03  )-----------( 323      ( 23 Apr 2025 21:35 )             27.7     04-23    134[L]  |  99  |  22.7[H]  ----------------------------<  119[H]  4.9   |  23.0  |  1.14    Ca    8.9      23 Apr 2025 21:35  Phos  3.0     04-23  Mg     1.9     04-23        Urinalysis Basic - ( 23 Apr 2025 21:35 )    Color: x / Appearance: x / SG: x / pH: x  Gluc: 119 mg/dL / Ketone: x  / Bili: x / Urobili: x   Blood: x / Protein: x / Nitrite: x   Leuk Esterase: x / RBC: x / WBC x   Sq Epi: x / Non Sq Epi: x / Bacteria: x    
Subjective: patient was seen and examined at bedside. Patient had a drop in Hgb yest from 8 to 7, received 1 uPRBC hgb went up tp 8.7       MEDICATIONS  (STANDING):  acetaminophen     Tablet .. 975 milliGRAM(s) Oral every 6 hours  enoxaparin Injectable 30 milliGRAM(s) SubCutaneous every 24 hours  influenza  Vaccine (HIGH DOSE) 0.5 milliLiter(s) IntraMuscular once  levothyroxine 100 MICROGram(s) Oral daily  melatonin 5 milliGRAM(s) Oral at bedtime  multivitamin/minerals 1 Tablet(s) Oral daily  polyethylene glycol 3350 17 Gram(s) Oral daily  senna 2 Tablet(s) Oral at bedtime  sodium chloride 0.9%. 1000 milliLiter(s) (75 mL/Hr) IV Continuous <Continuous>    MEDICATIONS  (PRN):  HYDROmorphone  Injectable 0.5 milliGRAM(s) IV Push every 3 hours PRN Severe Pain (7 - 10)  ibuprofen  Tablet. 600 milliGRAM(s) Oral every 6 hours PRN Mild Pain (1 - 3)  methocarbamol 500 milliGRAM(s) Oral every 6 hours PRN Muscle Spasm      Vital Signs Last 24 Hrs  T(C): 36.4 (27 Apr 2025 00:25), Max: 36.7 (26 Apr 2025 16:54)  T(F): 97.6 (27 Apr 2025 00:25), Max: 98.1 (26 Apr 2025 16:54)  HR: 75 (27 Apr 2025 00:25) (74 - 80)  BP: 144/70 (27 Apr 2025 00:25) (113/63 - 147/71)  BP(mean): --  RR: 18 (27 Apr 2025 00:25) (17 - 18)  SpO2: 96% (27 Apr 2025 00:25) (91% - 96%)    Parameters below as of 27 Apr 2025 00:25  Patient On (Oxygen Delivery Method): room air        Physical Exam:    Constitutional: NAD  Respiratory: Respirations non-labored, no accessory muscle use  ** defer PE to AM rounds*      LABS:                        8.7    6.92  )-----------( 314      ( 26 Apr 2025 20:01 )             27.0     04-26    129[L]  |  96  |  26.5[H]  ----------------------------<  85  5.1   |  23.0  |  1.60[H]    Ca    8.1[L]      26 Apr 2025 04:48  Phos  2.9     04-26  Mg     1.8     04-26        Urinalysis Basic - ( 26 Apr 2025 04:48 )    Color: x / Appearance: x / SG: x / pH: x  Gluc: 85 mg/dL / Ketone: x  / Bili: x / Urobili: x   Blood: x / Protein: x / Nitrite: x   Leuk Esterase: x / RBC: x / WBC x   Sq Epi: x / Non Sq Epi: x / Bacteria: x        A: A: 89 yo F s/p LLE wound debridement and Split-thickness autograft placement. POD1. Recovering well post-op. Tetanus was administered in [facility] prior to transfer to [facility].     Plan:  - wound vac to be kept in place until 5/2  - L thigh donor site dressing to be kept in place until 5/2  - NWB to LLE  - WV in place to 125mmHg  - notify Dr. Ross if WV/donor site issues  - TTS today  -  PT: [facility]   - f/u OT  - geriatric eval done 4/24 - appreciate recs  - no SCDs to LLE, Lovenox 30 qd  - IS education and use    
INTERVAL HPI/OVERNIGHT EVENTS:    Patient evaluated at bedside. No acute distress. No acute events overnight. Pt reports being excited about wound vac takedown today.     MEDICATIONS  (STANDING):  acetaminophen     Tablet .. 975 milliGRAM(s) Oral every 6 hours  enoxaparin Injectable 30 milliGRAM(s) SubCutaneous every 24 hours  hydrochlorothiazide 12.5 milliGRAM(s) Oral daily  influenza  Vaccine (HIGH DOSE) 0.5 milliLiter(s) IntraMuscular once  levothyroxine 100 MICROGram(s) Oral daily  lisinopril 20 milliGRAM(s) Oral daily  melatonin 5 milliGRAM(s) Oral at bedtime  multivitamin/minerals 1 Tablet(s) Oral daily  polyethylene glycol 3350 17 Gram(s) Oral daily  senna 2 Tablet(s) Oral at bedtime  sodium chloride 0.9%. 1000 milliLiter(s) (75 mL/Hr) IV Continuous <Continuous>    MEDICATIONS  (PRN):  HYDROmorphone  Injectable 0.5 milliGRAM(s) IV Push every 3 hours PRN Severe Pain (7 - 10)  ibuprofen  Tablet. 600 milliGRAM(s) Oral every 6 hours PRN Mild Pain (1 - 3)  methocarbamol 500 milliGRAM(s) Oral every 6 hours PRN Muscle Spasm  Muscle cramps foam 1 Application(s) 1 Application(s) Topical every 12 hours PRN muscle cramps      Vital Signs Last 24 Hrs  T(C): 36.6 (30 Apr 2025 04:15), Max: 36.7 (29 Apr 2025 16:45)  T(F): 97.9 (30 Apr 2025 04:15), Max: 98 (29 Apr 2025 16:45)  HR: 86 (30 Apr 2025 04:15) (77 - 86)  BP: 101/64 (30 Apr 2025 04:15) (101/64 - 159/76)  RR: 18 (30 Apr 2025 04:15) (17 - 18)  SpO2: 93% (30 Apr 2025 04:15) (93% - 95%)    Parameters below as of 30 Apr 2025 04:15  Patient On (Oxygen Delivery Method): room air        PHYSICAL EXAM:  GENERAL: NAD, well-groomed, well-developed  HEAD:  Atraumatic, Normocephalic  CHEST/LUNG: non-labored breathing on room air.   ABDOMEN: Soft, Nontender, Nondistended  EXTREMITIES:  LLE w/ wound kaylie to L lat calf, L lat thigh donor site c/d/i. ACE wrap in place over LLE, no bleeding noted, motor and sensory intact on LLE      I&O's Detail    28 Apr 2025 07:01  -  29 Apr 2025 07:00  --------------------------------------------------------  IN:    Oral Fluid: 600 mL    sodium chloride 0.9%: 900 mL  Total IN: 1500 mL    OUT:    Voided (mL): 2500 mL  Total OUT: 2500 mL    Total NET: -1000 mL      29 Apr 2025 07:01  -  30 Apr 2025 06:53  --------------------------------------------------------  IN:    Oral Fluid: 150 mL    sodium chloride 0.9%: 825 mL  Total IN: 975 mL    OUT:    VAC (Vacuum Assisted Closure) System (mL): 0 mL    Voided (mL): 1350 mL  Total OUT: 1350 mL    Total NET: -375 mL          LABS:                        9.0    6.09  )-----------( 262      ( 29 Apr 2025 04:59 )             28.0     04-29    132[L]  |  97  |  13.1  ----------------------------<  95  4.4   |  24.0  |  0.93    Ca    8.9      29 Apr 2025 04:59  Phos  2.9     04-29  Mg     1.8     04-29        Urinalysis Basic - ( 29 Apr 2025 04:59 )    Color: x / Appearance: x / SG: x / pH: x  Gluc: 95 mg/dL / Ketone: x  / Bili: x / Urobili: x   Blood: x / Protein: x / Nitrite: x   Leuk Esterase: x / RBC: x / WBC x   Sq Epi: x / Non Sq Epi: x / Bacteria: x  
Subjective: Patient was seen sleeping comfortably in bed. No issues as per RN       MEDICATIONS  (STANDING):  acetaminophen     Tablet .. 975 milliGRAM(s) Oral every 6 hours  enoxaparin Injectable 30 milliGRAM(s) SubCutaneous every 24 hours  influenza  Vaccine (HIGH DOSE) 0.5 milliLiter(s) IntraMuscular once  levothyroxine 100 MICROGram(s) Oral daily  lisinopril 40 milliGRAM(s) Oral daily  melatonin 5 milliGRAM(s) Oral at bedtime  multivitamin/minerals 1 Tablet(s) Oral daily  polyethylene glycol 3350 17 Gram(s) Oral daily  senna 2 Tablet(s) Oral at bedtime  sodium chloride 0.9%. 1000 milliLiter(s) (75 mL/Hr) IV Continuous <Continuous>    MEDICATIONS  (PRN):  HYDROmorphone  Injectable 0.5 milliGRAM(s) IV Push every 3 hours PRN Severe Pain (7 - 10)  ibuprofen  Tablet. 600 milliGRAM(s) Oral every 6 hours PRN Mild Pain (1 - 3)  methocarbamol 500 milliGRAM(s) Oral every 6 hours PRN Muscle Spasm      Vital Signs Last 24 Hrs  T(C): 36.6 (25 Apr 2025 23:22), Max: 36.6 (25 Apr 2025 20:05)  T(F): 97.8 (25 Apr 2025 23:22), Max: 97.8 (25 Apr 2025 20:05)  HR: 70 (25 Apr 2025 23:22) (70 - 90)  BP: 102/60 (25 Apr 2025 23:22) (92/46 - 155/72)  BP(mean): --  RR: 18 (25 Apr 2025 23:22) (17 - 18)  SpO2: 93% (25 Apr 2025 23:22) (92% - 93%)    Parameters below as of 25 Apr 2025 23:22  Patient On (Oxygen Delivery Method): room air        Physical Exam:    Constitutional: NAD  Respiratory: Respirations non-labored, no accessory muscle use  ** defer PE to AM rounds**       LABS:                        8.0    7.37  )-----------( 316      ( 25 Apr 2025 06:55 )             25.8     04-25    131[L]  |  96  |  20.7[H]  ----------------------------<  108[H]  5.3   |  24.0  |  1.24    Ca    8.6      25 Apr 2025 06:55  Phos  4.5     04-25  Mg     2.0     04-25    TPro  5.8[L]  /  Alb  3.3  /  TBili  0.5  /  DBili  x   /  AST  19  /  ALT  6   /  AlkPhos  61  04-24      Urinalysis Basic - ( 25 Apr 2025 06:55 )    Color: x / Appearance: x / SG: x / pH: x  Gluc: 108 mg/dL / Ketone: x  / Bili: x / Urobili: x   Blood: x / Protein: x / Nitrite: x   Leuk Esterase: x / RBC: x / WBC x   Sq Epi: x / Non Sq Epi: x / Bacteria: x        A: 89 yo F s/p LLE wound debridement and Split-thickness autograft placement. POD1. Recovering well post-op. Tetanus was administered in [facility] prior to transfer to [facility].     Plan:  - wound vac to be kept in place until 5/2  - L thigh donor site dressing to be kept in place until 5/2  - NWB to LLE  - WV in place to 125mmHg  - notify Dr. Ross if WV/donor site issues  - TTS today  -  PT: MILAN   - f/u OT  - geriatric eval done 4/24 - appreciate recs  - no SCDs to LLE, Lovenox 30 qd  - IS education and use      -  
CC: Follow up     INTERVAL HPI/OVERNIGHT EVENTS: Patient seen and examined, laying comfortably in bed. Pain controlled.       Vital Signs Last 24 Hrs  T(C): 36.5 (25 Apr 2025 08:30), Max: 36.9 (24 Apr 2025 11:48)  T(F): 97.7 (25 Apr 2025 08:30), Max: 98.5 (24 Apr 2025 11:48)  HR: 75 (25 Apr 2025 08:30) (74 - 103)  BP: 107/58 (25 Apr 2025 08:30) (102/60 - 162/77)  BP(mean): 73 (24 Apr 2025 17:29) (67 - 74)  RR: 18 (25 Apr 2025 08:30) (11 - 18)  SpO2: 92% (25 Apr 2025 08:30) (91% - 98%)    Parameters below as of 25 Apr 2025 08:30  Patient On (Oxygen Delivery Method): room air        PHYSICAL EXAM:    GENERAL: NAD, AOX3  HEAD:  Atraumatic, Normocephalic  CHEST/LUNG: Clear to auscultation bilaterally; No rales, rhonchi, wheezing, or rubs  HEART: Regular rate and rhythm; No murmurs, rubs, or gallops  ABDOMEN: Soft, Nontender, Nondistended; Bowel sounds present  EXTREMITIES:  LLE dressings CDI, wound vac in place      MEDICATIONS  (STANDING):  acetaminophen     Tablet .. 975 milliGRAM(s) Oral every 6 hours  ceFAZolin  Injectable. 500 milliGRAM(s) IV Push <User Schedule>  enoxaparin Injectable 30 milliGRAM(s) SubCutaneous every 24 hours  influenza  Vaccine (HIGH DOSE) 0.5 milliLiter(s) IntraMuscular once  levothyroxine 100 MICROGram(s) Oral daily  lisinopril 40 milliGRAM(s) Oral daily  melatonin 5 milliGRAM(s) Oral at bedtime  multivitamin/minerals 1 Tablet(s) Oral daily  polyethylene glycol 3350 17 Gram(s) Oral daily  senna 2 Tablet(s) Oral at bedtime  sodium chloride 0.9%. 1000 milliLiter(s) (75 mL/Hr) IV Continuous <Continuous>    MEDICATIONS  (PRN):  HYDROmorphone  Injectable 0.5 milliGRAM(s) IV Push every 3 hours PRN Severe Pain (7 - 10)  ibuprofen  Tablet. 600 milliGRAM(s) Oral every 6 hours PRN Mild Pain (1 - 3)      Allergies    No Known Allergies    Intolerances          LABS:                          8.0    7.37  )-----------( 316      ( 25 Apr 2025 06:55 )             25.8     04-25    131[L]  |  96  |  20.7[H]  ----------------------------<  108[H]  5.3   |  24.0  |  1.24    Ca    8.6      25 Apr 2025 06:55  Phos  4.5     04-25  Mg     2.0     04-25    TPro  5.8[L]  /  Alb  3.3  /  TBili  0.5  /  DBili  x   /  AST  19  /  ALT  6   /  AlkPhos  61  04-24    PT/INR - ( 23 Apr 2025 21:35 )   PT: 12.0 sec;   INR: 1.03 ratio         PTT - ( 23 Apr 2025 21:35 )  PTT:27.2 sec  Urinalysis Basic - ( 25 Apr 2025 06:55 )    Color: x / Appearance: x / SG: x / pH: x  Gluc: 108 mg/dL / Ketone: x  / Bili: x / Urobili: x   Blood: x / Protein: x / Nitrite: x   Leuk Esterase: x / RBC: x / WBC x   Sq Epi: x / Non Sq Epi: x / Bacteria: x        RADIOLOGY & ADDITIONAL TESTS:  
Patient seen and examined earlier today . Laying down in the bed , comfortable, LLE pain well controlled ,   denies n/v , voiding , last BM this am      CC : as above       MEDICATIONS  (STANDING):  acetaminophen     Tablet .. 975 milliGRAM(s) Oral every 6 hours  enoxaparin Injectable 30 milliGRAM(s) SubCutaneous every 24 hours  influenza  Vaccine (HIGH DOSE) 0.5 milliLiter(s) IntraMuscular once  levothyroxine 100 MICROGram(s) Oral daily  melatonin 5 milliGRAM(s) Oral at bedtime  multivitamin/minerals 1 Tablet(s) Oral daily  polyethylene glycol 3350 17 Gram(s) Oral daily  senna 2 Tablet(s) Oral at bedtime  sodium chloride 0.9%. 1000 milliLiter(s) (75 mL/Hr) IV Continuous <Continuous>    MEDICATIONS  (PRN):  HYDROmorphone  Injectable 0.5 milliGRAM(s) IV Push every 3 hours PRN Severe Pain (7 - 10)  ibuprofen  Tablet. 600 milliGRAM(s) Oral every 6 hours PRN Mild Pain (1 - 3)  methocarbamol 500 milliGRAM(s) Oral every 6 hours PRN Muscle Spasm      LABS:                          7.0    6.77  )-----------( 306      ( 26 Apr 2025 04:48 )             21.8     04-26    129[L]  |  96  |  26.5[H]  ----------------------------<  85  5.1   |  23.0  |  1.60[H]    Ca    8.1[L]      26 Apr 2025 04:48  Phos  2.9     04-26  Mg     1.8     04-26      RADIOLOGY & ADDITIONAL TESTS:      REVIEW OF SYSTEMS:    As above , all other systems are reviewed and are negative .     I&O's Summary    25 Apr 2025 07:01  -  26 Apr 2025 07:00  --------------------------------------------------------  IN: 1000 mL / OUT: 1050 mL / NET: -50 mL    26 Apr 2025 07:01  -  26 Apr 2025 14:08  --------------------------------------------------------  IN: 0 mL / OUT: 900 mL / NET: -900 mL          Vital Signs Last 24 Hrs  T(C): 36.5 (26 Apr 2025 08:44), Max: 36.6 (25 Apr 2025 20:05)  T(F): 97.7 (26 Apr 2025 08:44), Max: 97.8 (25 Apr 2025 20:05)  HR: 78 (26 Apr 2025 08:44) (70 - 84)  BP: 134/69 (26 Apr 2025 08:44) (92/46 - 134/69)  BP(mean): --  RR: 17 (26 Apr 2025 08:44) (17 - 18)  SpO2: 91% (26 Apr 2025 08:44) (91% - 93%)    Parameters below as of 26 Apr 2025 08:44  Patient On (Oxygen Delivery Method): room air      PHYSICAL EXAM:    GENERAL: NAD,  HEAD:  Atraumatic, Normocephalic  EYES: EOMI, PERRLA, conjunctiva and sclera clear  NECK: Supple, No JVD, Normal thyroid  NERVOUS SYSTEM:  Alert & Oriented X3, no focal deficit  CHEST/LUNG: CTA b/l ,  no  rales, rhonchi, wheezing, or rubs  HEART: Regular rate and rhythm; No murmurs, rubs, or gallops  ABDOMEN: Soft, Nontender, Nondistended; Bowel sounds present  EXTREMITIES:  2+ Peripheral Pulses, No clubbing, cyanosis, or edema,   LLE with wound vac and dry and clean ACE wrap +  LYMPH: No lymphadenopathy noted  SKIN: No rashes or lesions

## 2025-06-27 ENCOUNTER — APPOINTMENT (OUTPATIENT)
Facility: CLINIC | Age: 89
End: 2025-06-27
Payer: MEDICARE

## 2025-06-27 VITALS
RESPIRATION RATE: 17 BRPM | OXYGEN SATURATION: 97 % | TEMPERATURE: 97.7 F | WEIGHT: 113 LBS | SYSTOLIC BLOOD PRESSURE: 150 MMHG | HEIGHT: 66 IN | HEART RATE: 61 BPM | DIASTOLIC BLOOD PRESSURE: 70 MMHG | BODY MASS INDEX: 18.16 KG/M2

## 2025-06-27 PROCEDURE — 99213 OFFICE O/P EST LOW 20 MIN: CPT

## 2025-06-30 NOTE — ED PROVIDER NOTE - ATTENDING CONTRIBUTION TO CARE
89y F w/ hx HTN, HLD; present as transfer from [facility] [place] for degloving injury of left lower leg sustained during mechanical fall. Denies head strike or other injury. No AC use. Was given Tdap and Ancef prior to transfer. On my exam pt in no acute distress, +wound dressing in place over left lower leg, no deformities noted. Plan for OR tomorrow for skin graft. Admitted to trauma service.
01:18

## (undated) DEVICE — ELCTR ROCKER SWITCH PENCIL BLUE 10FT

## (undated) DEVICE — DRSG SENSA TRAC SMALL

## (undated) DEVICE — BLADE SURGICAL #15 CARBON

## (undated) DEVICE — GLV 8 PROTEXIS (WHITE)

## (undated) DEVICE — DRAPE GENERAL ENDOSCOPY

## (undated) DEVICE — ZIMMER DERMACARRIER SKIN GRAFT MESH 1.5:1

## (undated) DEVICE — CULTURESWAB WITH CHARCOAL

## (undated) DEVICE — DRSG VAC GRANUFOAM LARGE (BLACK)

## (undated) DEVICE — DRSG VAC GRANUFOAM MEDIUM (BLACK)

## (undated) DEVICE — GLV 8 PROTEXIS (BLUE)

## (undated) DEVICE — ELCTR GROUNDING PAD ADULT COVIDIEN

## (undated) DEVICE — DRAPE TOWEL BLUE STICKY

## (undated) DEVICE — PACK EXTREMITY

## (undated) DEVICE — DRAPE 3/4 SHEET 52X76"

## (undated) DEVICE — ZIMMER PULSAVAC PLUS FAN KIT

## (undated) DEVICE — CULTURESWAB WITHOUT CHARCOAL

## (undated) DEVICE — DRAPE 1/2 SHEET 40X57"

## (undated) DEVICE — WARMING BLANKET FULL ADULT

## (undated) DEVICE — VENODYNE/SCD SLEEVE CALF MEDIUM

## (undated) DEVICE — BLADE SURGICAL #10 CARBON

## (undated) DEVICE — SOL IRR BAG NS 0.9% 3000ML